# Patient Record
Sex: FEMALE | Race: BLACK OR AFRICAN AMERICAN | Employment: UNEMPLOYED | URBAN - METROPOLITAN AREA
[De-identification: names, ages, dates, MRNs, and addresses within clinical notes are randomized per-mention and may not be internally consistent; named-entity substitution may affect disease eponyms.]

---

## 2017-02-13 RX ORDER — HYDROCHLOROTHIAZIDE 25 MG/1
TABLET ORAL
Qty: 90 TABLET | Refills: 0 | OUTPATIENT
Start: 2017-02-13

## 2017-02-13 NOTE — TELEPHONE ENCOUNTER
Informed pt appt needed for f/u visit. Per pt need 90 day supply. Advised to make appt and Dr. Zander Salcido can renew for longer. Labs due and time for OV. appt made for tomorrow. Pt verbalized will do labs, fasting.        Hypertensive Medications  Protocol Criter

## 2017-02-14 ENCOUNTER — OFFICE VISIT (OUTPATIENT)
Dept: FAMILY MEDICINE CLINIC | Facility: CLINIC | Age: 57
End: 2017-02-14

## 2017-02-14 ENCOUNTER — APPOINTMENT (OUTPATIENT)
Dept: LAB | Age: 57
End: 2017-02-14
Attending: FAMILY MEDICINE
Payer: COMMERCIAL

## 2017-02-14 VITALS
WEIGHT: 234 LBS | HEART RATE: 92 BPM | TEMPERATURE: 98 F | DIASTOLIC BLOOD PRESSURE: 103 MMHG | SYSTOLIC BLOOD PRESSURE: 165 MMHG | HEIGHT: 67 IN | BODY MASS INDEX: 36.73 KG/M2

## 2017-02-14 DIAGNOSIS — F32.89 OTHER DEPRESSION: ICD-10-CM

## 2017-02-14 DIAGNOSIS — E78.00 HYPERCHOLESTEREMIA: Primary | ICD-10-CM

## 2017-02-14 DIAGNOSIS — E55.9 VITAMIN D DEFICIENCY: ICD-10-CM

## 2017-02-14 DIAGNOSIS — I10 ESSENTIAL HYPERTENSION: ICD-10-CM

## 2017-02-14 DIAGNOSIS — E78.00 HYPERCHOLESTEREMIA: ICD-10-CM

## 2017-02-14 DIAGNOSIS — E66.9 OBESITY (BMI 30-39.9): ICD-10-CM

## 2017-02-14 LAB
ALT SERPL-CCNC: 20 U/L (ref 14–54)
ANION GAP SERPL CALC-SCNC: 8 MMOL/L (ref 0–18)
AST SERPL-CCNC: 26 U/L (ref 15–41)
BUN SERPL-MCNC: 10 MG/DL (ref 8–20)
BUN/CREAT SERPL: 11.1 (ref 10–20)
CALCIUM SERPL-MCNC: 9.3 MG/DL (ref 8.5–10.5)
CHLORIDE SERPL-SCNC: 103 MMOL/L (ref 95–110)
CHOLEST SERPL-MCNC: 230 MG/DL (ref 110–200)
CO2 SERPL-SCNC: 27 MMOL/L (ref 22–32)
CREAT SERPL-MCNC: 0.9 MG/DL (ref 0.5–1.5)
GLUCOSE SERPL-MCNC: 82 MG/DL (ref 70–99)
HDLC SERPL-MCNC: 77 MG/DL
LDLC SERPL CALC-MCNC: 143 MG/DL (ref 0–99)
NONHDLC SERPL-MCNC: 153 MG/DL
OSMOLALITY UR CALC.SUM OF ELEC: 284 MOSM/KG (ref 275–295)
POTASSIUM SERPL-SCNC: 3.7 MMOL/L (ref 3.3–5.1)
SODIUM SERPL-SCNC: 138 MMOL/L (ref 136–144)
TRIGL SERPL-MCNC: 52 MG/DL (ref 1–149)
TSH SERPL-ACNC: 2.79 UIU/ML (ref 0.34–5.6)

## 2017-02-14 PROCEDURE — 80061 LIPID PANEL: CPT

## 2017-02-14 PROCEDURE — 84460 ALANINE AMINO (ALT) (SGPT): CPT

## 2017-02-14 PROCEDURE — 84450 TRANSFERASE (AST) (SGOT): CPT

## 2017-02-14 PROCEDURE — 99214 OFFICE O/P EST MOD 30 MIN: CPT | Performed by: FAMILY MEDICINE

## 2017-02-14 PROCEDURE — 99212 OFFICE O/P EST SF 10 MIN: CPT | Performed by: FAMILY MEDICINE

## 2017-02-14 PROCEDURE — 80048 BASIC METABOLIC PNL TOTAL CA: CPT

## 2017-02-14 PROCEDURE — 84443 ASSAY THYROID STIM HORMONE: CPT

## 2017-02-14 PROCEDURE — 36415 COLL VENOUS BLD VENIPUNCTURE: CPT

## 2017-02-14 RX ORDER — VALSARTAN 320 MG/1
TABLET ORAL
Qty: 90 TABLET | Refills: 1 | Status: SHIPPED | OUTPATIENT
Start: 2017-02-14 | End: 2017-05-22

## 2017-02-14 RX ORDER — HYDROCHLOROTHIAZIDE 25 MG/1
25 TABLET ORAL
Qty: 90 TABLET | Refills: 1 | Status: SHIPPED | OUTPATIENT
Start: 2017-02-14 | End: 2017-05-22

## 2017-02-14 NOTE — PROGRESS NOTES
Lennox Llano is a 55year old female. HPI:   Patient presents for recheck of her hypertension. Pt has been taking medications as instructed, no medication side effects      Ran out of bp medication 3 days ago.     Has gained some weight, has seen dieit Unspecified essential hypertension    • Primary hypothyroidism    • Other and unspecified hyperlipidemia           Past Surgical History    ELECTROCARDIOGRAM, COMPLETE  11/09/2013    Comment scanned to media tab, 11/09/2013    COLONOSCOPY  06/13/2014    C- dietary and eating habits as well as increasing vegetable and fruit intake. Recommending avoiding foods high in fat content. Recommend exercising at least 30-40 minutes 5-6 days a week. Avoid skipping meals.   Making healthy choices for snacks and also l

## 2017-02-17 ENCOUNTER — TELEPHONE (OUTPATIENT)
Dept: FAMILY MEDICINE CLINIC | Facility: CLINIC | Age: 57
End: 2017-02-17

## 2017-02-18 NOTE — TELEPHONE ENCOUNTER
Notes Recorded by Ben Oswald MD on 2/18/2017 at 1:18 PM  Labs ok except cholesterol still very elevated. would recommend starting statin. pls call patient. LMTCB, please transfer to [id number].

## 2017-02-22 NOTE — TELEPHONE ENCOUNTER
Pt was inform Madina message below and pt verbalized understanding. She will hold off on the statins and will talk to  on 2/28. Thanks

## 2017-02-28 ENCOUNTER — TELEPHONE (OUTPATIENT)
Dept: FAMILY MEDICINE CLINIC | Facility: CLINIC | Age: 57
End: 2017-02-28

## 2017-02-28 ENCOUNTER — OFFICE VISIT (OUTPATIENT)
Dept: FAMILY MEDICINE CLINIC | Facility: CLINIC | Age: 57
End: 2017-02-28

## 2017-02-28 VITALS
BODY MASS INDEX: 36.73 KG/M2 | SYSTOLIC BLOOD PRESSURE: 159 MMHG | DIASTOLIC BLOOD PRESSURE: 95 MMHG | HEART RATE: 113 BPM | HEIGHT: 67 IN | WEIGHT: 234 LBS | TEMPERATURE: 99 F

## 2017-02-28 DIAGNOSIS — R68.89 FLU-LIKE SYMPTOMS: ICD-10-CM

## 2017-02-28 DIAGNOSIS — B34.9 VIRAL ILLNESS: Primary | ICD-10-CM

## 2017-02-28 LAB
FLUAV + FLUBV RNA SPEC NAA+PROBE: NEGATIVE
FLUAV + FLUBV RNA SPEC NAA+PROBE: NEGATIVE
FLUAV + FLUBV RNA SPEC NAA+PROBE: POSITIVE

## 2017-02-28 PROCEDURE — 99213 OFFICE O/P EST LOW 20 MIN: CPT | Performed by: FAMILY MEDICINE

## 2017-02-28 PROCEDURE — 99212 OFFICE O/P EST SF 10 MIN: CPT | Performed by: FAMILY MEDICINE

## 2017-02-28 RX ORDER — OSELTAMIVIR PHOSPHATE 75 MG/1
75 CAPSULE ORAL 2 TIMES DAILY
Qty: 10 CAPSULE | Refills: 0 | Status: SHIPPED | OUTPATIENT
Start: 2017-02-28 | End: 2017-03-05

## 2017-02-28 NOTE — PROGRESS NOTES
HPI:   Katerin Reddy is a 55year old female who presents for upper respiratory symptoms for  1  days. Patient reports sore throat, congestion, fever with Tmax to 101, body aches.       Current Outpatient Prescriptions:  valsartan 320 MG Oral Tab TAKE 1 TAB conjunctiva are clear  HEENT: atraumatic, normocephalic,ears and throat are clear  NECK: supple,no adenopathy,no bruits  LUNGS: clear to auscultation  CARDIO: RRR without murmur  GI: good BS's,no masses, HSM or tenderness    ASSESSMENT AND PLAN:   Stephanie BUCK

## 2017-03-02 ENCOUNTER — TELEPHONE (OUTPATIENT)
Dept: FAMILY MEDICINE CLINIC | Facility: CLINIC | Age: 57
End: 2017-03-02

## 2017-03-02 NOTE — TELEPHONE ENCOUNTER
Reason for Call/Chief Complaint: headache  Onset: today  Nursing Assessment/Associated Symptoms: has been taking Tamiflu since Tuesday 2/28 and is noticing that her h/a is increasing, also c/o ear pressure.  Wants to know if the Tamiflu could be causing her

## 2017-03-02 NOTE — TELEPHONE ENCOUNTER
Pt states having headache  taking Tamiflu since Tuesday  Asking if can take Tylenol with other medications?   Saw Dr Clarence Song 2/28

## 2017-03-02 NOTE — TELEPHONE ENCOUNTER
Pt informed of Dr Rolanda Chambers response below. Pt will stop taking Tamiflu, push fluids, call back if no improvement/worsening--ER if severe. Pt agrees.

## 2017-03-02 NOTE — TELEPHONE ENCOUNTER
tamiflu may cause a headache,  She can try to stop and see if better  Push fluids  if worsening, call.

## 2017-05-22 ENCOUNTER — OFFICE VISIT (OUTPATIENT)
Dept: FAMILY MEDICINE CLINIC | Facility: CLINIC | Age: 57
End: 2017-05-22

## 2017-05-22 ENCOUNTER — TELEPHONE (OUTPATIENT)
Dept: GASTROENTEROLOGY | Facility: CLINIC | Age: 57
End: 2017-05-22

## 2017-05-22 VITALS
DIASTOLIC BLOOD PRESSURE: 85 MMHG | HEIGHT: 67 IN | BODY MASS INDEX: 35.79 KG/M2 | TEMPERATURE: 98 F | WEIGHT: 228 LBS | SYSTOLIC BLOOD PRESSURE: 148 MMHG | HEART RATE: 91 BPM

## 2017-05-22 DIAGNOSIS — F43.23 ADJUSTMENT DISORDER WITH MIXED ANXIETY AND DEPRESSED MOOD: ICD-10-CM

## 2017-05-22 DIAGNOSIS — Z00.00 WELL ADULT EXAM: Primary | ICD-10-CM

## 2017-05-22 DIAGNOSIS — E55.9 VITAMIN D DEFICIENCY: ICD-10-CM

## 2017-05-22 DIAGNOSIS — I10 ESSENTIAL HYPERTENSION: ICD-10-CM

## 2017-05-22 DIAGNOSIS — E66.9 OBESITY (BMI 30-39.9): ICD-10-CM

## 2017-05-22 DIAGNOSIS — R20.2 PARESTHESIA OF LEFT FOOT: ICD-10-CM

## 2017-05-22 DIAGNOSIS — E78.00 HYPERCHOLESTEREMIA: ICD-10-CM

## 2017-05-22 PROCEDURE — 99396 PREV VISIT EST AGE 40-64: CPT | Performed by: FAMILY MEDICINE

## 2017-05-22 RX ORDER — VALSARTAN 320 MG/1
TABLET ORAL
Qty: 90 TABLET | Refills: 3 | Status: SHIPPED | OUTPATIENT
Start: 2017-05-22 | End: 2018-03-19

## 2017-05-22 RX ORDER — HYDROCHLOROTHIAZIDE 25 MG/1
25 TABLET ORAL
Qty: 90 TABLET | Refills: 3 | Status: SHIPPED | OUTPATIENT
Start: 2017-05-22 | End: 2018-05-31

## 2017-05-22 NOTE — PROGRESS NOTES
HPI:   Duane Bertrand is a 55year old female who presents for a complete physical exam. Symptoms: is menopausal.    C/o numbness/ tingling left 1st toe.  X 2 weeks  No weakness no back pain    Wt Readings from Last 6 Encounters:  05/22/17 : 228 lb (103.42 k Relation Age of Onset   • Hypertension Father    • Cancer Father      intestinal cancer   • Hypertension Mother    • Diabetes Mother    • Hypertension     • Heart Disease     • Heart Disorder Maternal Grandmother 68   • Asthma Paternal Grandmother       So three,cranial nerves are intact,motor and sensory are grossly intact    ASSESSMENT AND PLAN:   Wayne Cordova is a 55year old female who presents for a complete physical exam.Health maintenance, will check fasting Lipids, CMP, and CBC.  Pt info handouts giv

## 2017-05-22 NOTE — TELEPHONE ENCOUNTER
Last colonoscopy 6/13/14 recalled for 5 years for family history of colon cancer - Due 6/2019. Health maintenance is up to date.   Paige Almaraz notified in [facility]

## 2017-06-10 ENCOUNTER — LAB ENCOUNTER (OUTPATIENT)
Dept: LAB | Age: 57
End: 2017-06-10
Attending: FAMILY MEDICINE
Payer: COMMERCIAL

## 2017-06-10 DIAGNOSIS — I10 ESSENTIAL HYPERTENSION: ICD-10-CM

## 2017-06-10 DIAGNOSIS — Z00.00 WELL ADULT EXAM: ICD-10-CM

## 2017-06-10 DIAGNOSIS — E78.00 HYPERCHOLESTEREMIA: ICD-10-CM

## 2017-06-10 DIAGNOSIS — E66.9 OBESITY (BMI 30-39.9): ICD-10-CM

## 2017-06-10 DIAGNOSIS — R20.2 PARESTHESIA OF LEFT FOOT: ICD-10-CM

## 2017-06-10 PROCEDURE — 82607 VITAMIN B-12: CPT

## 2017-06-10 PROCEDURE — 36415 COLL VENOUS BLD VENIPUNCTURE: CPT

## 2017-06-10 PROCEDURE — 85025 COMPLETE CBC W/AUTO DIFF WBC: CPT

## 2017-06-10 PROCEDURE — 85060 BLOOD SMEAR INTERPRETATION: CPT

## 2017-06-10 PROCEDURE — 85007 BL SMEAR W/DIFF WBC COUNT: CPT

## 2017-06-10 PROCEDURE — 83036 HEMOGLOBIN GLYCOSYLATED A1C: CPT

## 2017-06-10 PROCEDURE — 80053 COMPREHEN METABOLIC PANEL: CPT

## 2017-06-10 PROCEDURE — 80061 LIPID PANEL: CPT

## 2017-06-10 PROCEDURE — 84550 ASSAY OF BLOOD/URIC ACID: CPT

## 2017-06-10 PROCEDURE — 85027 COMPLETE CBC AUTOMATED: CPT

## 2017-06-12 ENCOUNTER — TELEPHONE (OUTPATIENT)
Dept: FAMILY MEDICINE CLINIC | Facility: CLINIC | Age: 57
End: 2017-06-12

## 2017-06-19 NOTE — TELEPHONE ENCOUNTER
Notes Recorded by Pete Philip MD on 6/17/2017 at 1:36 PM  Blood work shows there is no diabetes but you are at risk for diabetes.  Recommend losing weight and decreasing carbohydrate intake and increasing vegetable intake.  B12 level is normal. [employer]

## 2017-06-19 NOTE — TELEPHONE ENCOUNTER
Reviewed lab results with pt. Mailed copies per pt's request.  Pt is also asking doctor if it is ok for her to take a multivitamin, magnesium and fish oil. Pt states that she believes magnesium may help her with her dry skin.   States no lotions have help

## 2017-07-22 ENCOUNTER — OFFICE VISIT (OUTPATIENT)
Dept: OBGYN CLINIC | Facility: CLINIC | Age: 57
End: 2017-07-22

## 2017-07-22 VITALS
WEIGHT: 231.38 LBS | DIASTOLIC BLOOD PRESSURE: 95 MMHG | SYSTOLIC BLOOD PRESSURE: 141 MMHG | BODY MASS INDEX: 35.48 KG/M2 | HEIGHT: 67.75 IN | HEART RATE: 105 BPM

## 2017-07-22 DIAGNOSIS — Z12.31 VISIT FOR SCREENING MAMMOGRAM: ICD-10-CM

## 2017-07-22 DIAGNOSIS — Z01.419 ENCOUNTER FOR GYNECOLOGICAL EXAMINATION WITHOUT ABNORMAL FINDING: Primary | ICD-10-CM

## 2017-07-22 DIAGNOSIS — Z12.4 SCREENING FOR MALIGNANT NEOPLASM OF CERVIX: ICD-10-CM

## 2017-07-22 PROCEDURE — 99396 PREV VISIT EST AGE 40-64: CPT | Performed by: OBSTETRICS & GYNECOLOGY

## 2017-07-22 NOTE — PROGRESS NOTES
Chema Jules is a 65year old female  No LMP recorded. Patient is not currently having periods (Reason: Menopause). who presents for Patient presents with:  Gyn Exam: annual exam, mammo order  Pt is new to our practice. She has no complaints.   Pt needed. , Disp: 60 g, Rfl: 1  •  aspirin 81 MG Oral Tab, Take 81 mg by mouth daily. , Disp: , Rfl:   •  Vitamin D3 (VITAMIN D3) 2000 UNITS Oral Cap, Take 2,000 Units by mouth daily. , Disp: , Rfl:     ALLERGIES:  No Known Allergies      Review of Systems:  Co tenderness on motion  Uterus: normal in size, contour, position, mobility, without tenderness  Adnexa: normal without masses or tenderness  Perineum: normal  Rectovaginal: no masses, normal tone  Anus: no hemorroids    Assessment & Plan:   ASCCP guidelines

## 2017-07-24 LAB — HPV I/H RISK 1 DNA SPEC QL NAA+PROBE: NEGATIVE

## 2017-08-03 ENCOUNTER — TELEPHONE (OUTPATIENT)
Dept: FAMILY MEDICINE CLINIC | Facility: CLINIC | Age: 57
End: 2017-08-03

## 2017-08-03 NOTE — TELEPHONE ENCOUNTER
Protocol Used: Sore Throat (Adult)  Protocol-Based Disposition: See Today in Office    Positive Triage Question:  * Severe sore throat pain    Negative Triage Questions:   * Severe difficulty breathing (e.g., struggling for each breath, speaks in single wor

## 2017-08-03 NOTE — TELEPHONE ENCOUNTER
This was not addressed by CSS until 4:20pm. LM with pt asking if she could see Dr. Gio Cedillo at 6:30pm tonight at our [place] [id number].? Please confirm upon pt's call back.  Thank you

## 2017-08-03 NOTE — TELEPHONE ENCOUNTER
Actions Requested: Please sign off  Problem: sore throat  Onset and Timing: last night  Associated Symptoms: hurts to swallow,  Aggravating by: nothing  Alleviated by: nothing  Triage Note: Pt denies fever, nasal congestion, denies allergies/cough, SOB.  No

## 2017-08-03 NOTE — TELEPHONE ENCOUNTER
Pt was contacted. She will not be able to see Dr. Joe Mondragon this evening.  She stts that she will probably end up going to an urgent care

## 2017-08-07 NOTE — TELEPHONE ENCOUNTER
Patient was left a message to call back if symptoms continue and has not seen anyone for tx.  Transfer to [facility]

## 2017-09-02 ENCOUNTER — OFFICE VISIT (OUTPATIENT)
Dept: FAMILY MEDICINE CLINIC | Facility: CLINIC | Age: 57
End: 2017-09-02

## 2017-09-02 VITALS
SYSTOLIC BLOOD PRESSURE: 137 MMHG | TEMPERATURE: 98 F | DIASTOLIC BLOOD PRESSURE: 87 MMHG | HEART RATE: 99 BPM | HEIGHT: 67 IN | BODY MASS INDEX: 36.41 KG/M2 | WEIGHT: 232 LBS

## 2017-09-02 DIAGNOSIS — I83.93 VARICOSE VEINS OF LEGS: ICD-10-CM

## 2017-09-02 DIAGNOSIS — R20.2 PARESTHESIA OF LEFT FOOT: ICD-10-CM

## 2017-09-02 DIAGNOSIS — I10 ESSENTIAL HYPERTENSION: Primary | ICD-10-CM

## 2017-09-02 PROCEDURE — 99214 OFFICE O/P EST MOD 30 MIN: CPT | Performed by: FAMILY MEDICINE

## 2017-09-02 PROCEDURE — 99212 OFFICE O/P EST SF 10 MIN: CPT | Performed by: FAMILY MEDICINE

## 2017-09-02 NOTE — PATIENT INSTRUCTIONS
Self-Care for Spider and Varicose Veins  Your healthcare provider may suggest that you try self-care. Exercising and maintaining a healthy weight may keep problem veins from getting worse.  Wearing elastic stockings and elevating your legs can help improv When sitting and standing  To keep blood moving when you have to sit or stand for long periods, try these tips:  · At work, take walking breaks instead of coffee breaks. Walk during your lunch hour. Or try flexing your feet up and down 10 times each hour.

## 2017-09-02 NOTE — PROGRESS NOTES
Cierra Noriega is a 65year old female. HPI:   Patient presents for recheck of her hypertension.  Pt has been taking medications as instructed, no medication side effects,     Wt Readings from Last 6 Encounters:  09/02/17 : 232 lb (105.2 kg)  07/22/17 : History:  No date:       Comment: 2  2014: COLONOSCOPY  2013: ELECTROCARDIOGRAM, COMPLETE      Comment: scanned to media tab, 2013   Social History:    Smoking status: Never Smoker

## 2017-10-17 ENCOUNTER — OFFICE VISIT (OUTPATIENT)
Dept: FAMILY MEDICINE CLINIC | Facility: CLINIC | Age: 57
End: 2017-10-17

## 2017-10-17 VITALS
TEMPERATURE: 98 F | DIASTOLIC BLOOD PRESSURE: 81 MMHG | BODY MASS INDEX: 35.94 KG/M2 | SYSTOLIC BLOOD PRESSURE: 108 MMHG | HEART RATE: 98 BPM | WEIGHT: 229 LBS | HEIGHT: 67 IN

## 2017-10-17 DIAGNOSIS — F43.21 GRIEF REACTION: ICD-10-CM

## 2017-10-17 DIAGNOSIS — B35.4 TINEA CORPORIS: ICD-10-CM

## 2017-10-17 DIAGNOSIS — I10 ESSENTIAL HYPERTENSION: ICD-10-CM

## 2017-10-17 DIAGNOSIS — L29.9 PRURITUS: Primary | ICD-10-CM

## 2017-10-17 DIAGNOSIS — L50.3 DERMATOGRAPHISM: ICD-10-CM

## 2017-10-17 PROCEDURE — 99212 OFFICE O/P EST SF 10 MIN: CPT | Performed by: FAMILY MEDICINE

## 2017-10-17 PROCEDURE — 99214 OFFICE O/P EST MOD 30 MIN: CPT | Performed by: FAMILY MEDICINE

## 2017-10-17 RX ORDER — HYDROXYZINE HYDROCHLORIDE 25 MG/1
25 TABLET, FILM COATED ORAL 3 TIMES DAILY PRN
Qty: 30 TABLET | Refills: 0 | Status: SHIPPED | OUTPATIENT
Start: 2017-10-17 | End: 2017-11-28

## 2017-10-17 RX ORDER — KETOCONAZOLE 20 MG/G
1 CREAM TOPICAL 2 TIMES DAILY
Qty: 1 TUBE | Refills: 0 | Status: SHIPPED | OUTPATIENT
Start: 2017-10-17 | End: 2018-03-19

## 2017-10-17 NOTE — PROGRESS NOTES
HPI:    Patient ID: Katerin Reddy is a 65year old female. HPI     Patient is here with complains of rash on the left side of her mouth that she has noticed recently. She states is slightly C shaped at this and is on the left corner of her mouth.   It i times daily as needed. Disp: 60 g Rfl: 1     Allergies:No Known Allergies   PHYSICAL EXAM:   Physical Exam   Constitutional: She appears well-developed and well-nourished. Skin: Rash noted.    Mouth- left angle of mouth, slightly raised with some central

## 2017-10-28 ENCOUNTER — HOSPITAL ENCOUNTER (OUTPATIENT)
Dept: MAMMOGRAPHY | Age: 57
Discharge: HOME | End: 2017-10-28
Attending: OBSTETRICS & GYNECOLOGY
Payer: COMMERCIAL

## 2017-10-28 DIAGNOSIS — Z12.31 VISIT FOR SCREENING MAMMOGRAM: ICD-10-CM

## 2017-10-28 PROCEDURE — 77067 SCR MAMMO BI INCL CAD: CPT | Performed by: OBSTETRICS & GYNECOLOGY

## 2017-11-14 ENCOUNTER — OFFICE VISIT (OUTPATIENT)
Dept: PODIATRY CLINIC | Facility: CLINIC | Age: 57
End: 2017-11-14

## 2017-11-14 DIAGNOSIS — R20.2 PARESTHESIA OF LEFT FOOT: Primary | ICD-10-CM

## 2017-11-14 PROCEDURE — 99243 OFF/OP CNSLTJ NEW/EST LOW 30: CPT | Performed by: PODIATRIST

## 2017-11-14 PROCEDURE — 99212 OFFICE O/P EST SF 10 MIN: CPT | Performed by: PODIATRIST

## 2017-11-15 NOTE — PROGRESS NOTES
HPI:    Patient ID: Regis Colon is a 65year old female. HPI  This pleasant 60-year-old female presents as a new patient to me on consult from .   Patient has a very unusual complaint and feels as though there is a band at the base of her left g the feeling were talking about is not related to pain. She does not have the feeling elsewhere in this foot and I am struggling to make the diagnosis.   I discussed potential for diabetes although there is a positive family history her labs are within norm

## 2017-11-28 ENCOUNTER — OFFICE VISIT (OUTPATIENT)
Dept: DERMATOLOGY CLINIC | Facility: CLINIC | Age: 57
End: 2017-11-28

## 2017-11-28 DIAGNOSIS — L50.9 URTICARIA: ICD-10-CM

## 2017-11-28 DIAGNOSIS — L50.3 DERMATOGRAPHISM: Primary | ICD-10-CM

## 2017-11-28 PROCEDURE — 99202 OFFICE O/P NEW SF 15 MIN: CPT | Performed by: DERMATOLOGY

## 2017-11-28 PROCEDURE — 99212 OFFICE O/P EST SF 10 MIN: CPT | Performed by: DERMATOLOGY

## 2017-11-28 RX ORDER — LEVOCETIRIZINE DIHYDROCHLORIDE 5 MG/1
5 TABLET, FILM COATED ORAL DAILY
Qty: 30 TABLET | Refills: 10 | Status: SHIPPED | OUTPATIENT
Start: 2017-11-28 | End: 2019-03-30

## 2017-11-28 NOTE — PROGRESS NOTES
Patient states that itching HPI:     Chief Complaint     Rash        HPI     Rash    Additional comments: New pt. Pt presents with concern of generalized pruritic condition with hive developement.   Pt states that hives and pruritus wax and wane for the la Rfl:    valsartan 320 MG Oral Tab TAKE 1 TABLET BY MOUTH ONCE DAILY. Disp: 90 tablet Rfl: 3   hydrochlorothiazide 25 MG Oral Tab Take 1 tablet (25 mg total) by mouth once daily. Disp: 90 tablet Rfl: 3   aspirin 81 MG Oral Tab Take 81 mg by mouth daily.  Dis scaling. Skin not xerotic. ASSESSMENT/PLAN:   Dermatographism  (primary encounter diagnosis)  Urticaria  I have told patient that over 50% of these cases are idiopathic and we may never know the etiology.   I told her to be sure she is up to s date wi

## 2017-11-28 NOTE — PROGRESS NOTES
Past Medical History:   Diagnosis Date   • Other and unspecified hyperlipidemia    • Primary hypothyroidism    • Unspecified essential hypertension      Past Surgical History:  No date:       Comment: 2  2014: COLONOSCOPY  2013: Maggie Huerta

## 2017-11-28 NOTE — PATIENT INSTRUCTIONS
Recommend avoidance of fabric softener and use of enzyme free detergents ( eg Dreft or Arm and Hammer) only.   Moisturize head to toe after bathing with Cerave or Cetaphil cream. Use preferably soapless cleansers such as Cetaphil, or fatted soaps such as un

## 2018-01-03 RX ORDER — VALSARTAN 320 MG/1
TABLET ORAL
Qty: 90 TABLET | Refills: 0 | Status: SHIPPED | OUTPATIENT
Start: 2018-01-03 | End: 2018-03-19

## 2018-01-03 NOTE — TELEPHONE ENCOUNTER
Hypertensive Medications: Refilled per protocol    Protocol Criteria:  · Appointment scheduled in the past 6 months or in the next 3 months  · BMP or CMP in the past 12 months  · Creatinine result < 2  Recent Outpatient Visits            1 month ago Dermat

## 2018-03-17 ENCOUNTER — TELEPHONE (OUTPATIENT)
Dept: OTHER | Age: 58
End: 2018-03-17

## 2018-03-17 NOTE — TELEPHONE ENCOUNTER
Pt asking if doctor would order an Xray of her leg. Pt state she her left calf, ankle, and leg feel heavy. Pt states she has talked to doctor in the past about these symptoms. No burning, no redness or warmth.  Sometimes left calf looks bigger than the righ

## 2018-03-17 NOTE — TELEPHONE ENCOUNTER
Patient has had a left leg Doppler in the past in 2015 and was negative. She should schedule an appointment so I can evaluate this better.

## 2018-03-19 ENCOUNTER — OFFICE VISIT (OUTPATIENT)
Dept: FAMILY MEDICINE CLINIC | Facility: CLINIC | Age: 58
End: 2018-03-19

## 2018-03-19 VITALS
TEMPERATURE: 99 F | HEIGHT: 68 IN | BODY MASS INDEX: 35.35 KG/M2 | SYSTOLIC BLOOD PRESSURE: 134 MMHG | DIASTOLIC BLOOD PRESSURE: 87 MMHG | WEIGHT: 233.25 LBS | HEART RATE: 94 BPM

## 2018-03-19 DIAGNOSIS — R20.2 PARESTHESIA OF LEFT LEG: ICD-10-CM

## 2018-03-19 DIAGNOSIS — R29.898 LEG HEAVINESS: Primary | ICD-10-CM

## 2018-03-19 DIAGNOSIS — I10 ESSENTIAL HYPERTENSION: ICD-10-CM

## 2018-03-19 PROCEDURE — 99212 OFFICE O/P EST SF 10 MIN: CPT | Performed by: FAMILY MEDICINE

## 2018-03-19 PROCEDURE — 99214 OFFICE O/P EST MOD 30 MIN: CPT | Performed by: FAMILY MEDICINE

## 2018-03-19 RX ORDER — VALSARTAN 320 MG/1
320 TABLET ORAL
Qty: 90 TABLET | Refills: 3 | Status: SHIPPED | OUTPATIENT
Start: 2018-03-19 | End: 2019-04-05

## 2018-03-19 NOTE — PROGRESS NOTES
HPI:    Patient ID: Jaison Posey is a 65year old female. HPI     Patient here for follow up of left leg tingling/ and sensation of heaviness since over 2 yrs  Has had neg US for DVT in 2015    No back pain  Feels middle 3 toes get numb.       Review of person, place, and time. Skin: Skin is warm and dry. No rash noted. ASSESSMENT/PLAN:   Leg heaviness  (primary encounter diagnosis)  Paresthesia of left leg  Essential hypertension    1.  Leg heaviness  Suspect lumbar radiculopathy  - D-DIMER

## 2018-03-22 ENCOUNTER — PATIENT MESSAGE (OUTPATIENT)
Dept: FAMILY MEDICINE CLINIC | Facility: CLINIC | Age: 58
End: 2018-03-22

## 2018-03-23 NOTE — TELEPHONE ENCOUNTER
From: Binnie Landau  To: Darshan Millan MD  Sent: 3/22/2018 11:51 AM CDT  Subject: Other    Dear Dr. Clarence Song,  I am considering starting a vitamin by [employer] (Hair, Skin & Nails, Argan Oil Infused with 5000mcg Biotin, Antioxidants C & E) + Hyaluronic A

## 2018-03-30 ENCOUNTER — LAB ENCOUNTER (OUTPATIENT)
Dept: LAB | Age: 58
End: 2018-03-30
Attending: FAMILY MEDICINE
Payer: COMMERCIAL

## 2018-03-30 DIAGNOSIS — R29.898 LEG HEAVINESS: ICD-10-CM

## 2018-03-30 DIAGNOSIS — R20.2 PARESTHESIA OF LEFT LEG: ICD-10-CM

## 2018-03-30 DIAGNOSIS — I10 ESSENTIAL HYPERTENSION: ICD-10-CM

## 2018-03-30 PROCEDURE — 36415 COLL VENOUS BLD VENIPUNCTURE: CPT

## 2018-03-30 PROCEDURE — 80053 COMPREHEN METABOLIC PANEL: CPT

## 2018-03-30 PROCEDURE — 80050 GENERAL HEALTH PANEL: CPT

## 2018-03-30 PROCEDURE — 85025 COMPLETE CBC W/AUTO DIFF WBC: CPT

## 2018-03-30 PROCEDURE — 85379 FIBRIN DEGRADATION QUANT: CPT

## 2018-03-30 PROCEDURE — 82607 VITAMIN B-12: CPT

## 2018-04-02 ENCOUNTER — TELEPHONE (OUTPATIENT)
Dept: FAMILY MEDICINE CLINIC | Facility: CLINIC | Age: 58
End: 2018-04-02

## 2018-04-02 ENCOUNTER — HOSPITAL ENCOUNTER (OUTPATIENT)
Dept: ULTRASOUND IMAGING | Facility: HOSPITAL | Age: 58
Discharge: HOME | End: 2018-04-02
Attending: FAMILY MEDICINE
Payer: COMMERCIAL

## 2018-04-02 DIAGNOSIS — R20.2 PARESTHESIA OF LEFT LEG: ICD-10-CM

## 2018-04-02 DIAGNOSIS — R79.89 ELEVATED D-DIMER: ICD-10-CM

## 2018-04-02 PROCEDURE — 93971 EXTREMITY STUDY: CPT | Performed by: FAMILY MEDICINE

## 2018-04-02 NOTE — TELEPHONE ENCOUNTER
Notes recorded by Jose Salcedo MD on 4/1/2018 at 10:19 PM CDT  Labs ok other than D dimer elevated  Recommend us left leg  Has had symptoms for 2 yrs. Neg US in 2015.   Recommend to get test tomorrow 4/2

## 2018-04-02 NOTE — TELEPHONE ENCOUNTER
COURT. Please reply to pool: EM RN TRIAGE    Nurse triage - after speaking w/ pt, please call US at [phone] to figure out a time when they can fit patient in. They are only there until 8p.

## 2018-04-11 ENCOUNTER — OFFICE VISIT (OUTPATIENT)
Dept: NEUROLOGY | Facility: CLINIC | Age: 58
End: 2018-04-11

## 2018-04-11 DIAGNOSIS — R20.2 PARESTHESIA OF LEFT LEG: Primary | ICD-10-CM

## 2018-04-11 PROCEDURE — 95908 NRV CNDJ TST 3-4 STUDIES: CPT | Performed by: PHYSICAL MEDICINE & REHABILITATION

## 2018-04-11 PROCEDURE — 95886 MUSC TEST DONE W/N TEST COMP: CPT | Performed by: PHYSICAL MEDICINE & REHABILITATION

## 2018-04-11 NOTE — PROCEDURES
ANTONY De Luna  [address] [facility]  Phone: [phone]  Fax: [phone] REPORT          Patient: Janelle Garcia YOB: 1960  Patient ID: [id number] Hand Dominance: right hand AH 5.42 4.0 4.79 Ankle - AH 8        Knee AH 15.31 3.9 5.68 Knee - Ankle 45 9.90 45       Sensory NCS      Nerve / Sites Rec.  Site Onset Lat Peak Lat NP Amp PP Amp Segments Distance Velocity     ms ms µV µV  cm m/s   L SURAL - Lat Mall      Calf Lat Mall 3

## 2018-04-17 ENCOUNTER — TELEPHONE (OUTPATIENT)
Dept: FAMILY MEDICINE CLINIC | Facility: CLINIC | Age: 58
End: 2018-04-17

## 2018-04-17 NOTE — TELEPHONE ENCOUNTER
----- Message from Vito Wilks MD sent at 4/15/2018 10:28 PM CDT -----  Normal emg study.  Recommend seeing vascular surgery  for eval as emg normal.  pls inform patient    Referred to  Pamela Millan [place]  [address] [facility]

## 2018-06-01 RX ORDER — HYDROCHLOROTHIAZIDE 25 MG/1
25 TABLET ORAL
Qty: 90 TABLET | Refills: 0 | Status: SHIPPED | OUTPATIENT
Start: 2018-06-01 | End: 2018-09-01

## 2018-06-01 NOTE — TELEPHONE ENCOUNTER
Requesting HCTZ refill    Prescription refilled per IM/FM refill protocol    Hypertensive Medications  Protocol Criteria:  · Appointment scheduled in the past 6 months or in the next 3 months  · BMP or CMP in the past 12 months  · Creatinine result < 2  Re

## 2018-06-19 ENCOUNTER — TELEPHONE (OUTPATIENT)
Dept: OTHER | Age: 58
End: 2018-06-19

## 2018-06-19 NOTE — TELEPHONE ENCOUNTER
Dr Zander Salcido, please advise. Patient continues with issues of her left leg, starting to have pain again.  Has appt 7/17/18 with vascular surgeon, she is on their waiting list. Verner Ice about PAD, peripheral arterial disease and wants to know about getting tested

## 2018-06-19 NOTE — TELEPHONE ENCOUNTER
Left detailed message on dedicated voice mail relaying Dr Mercedes Fernandez message, her last physical was May 2017 and she can schedule it at any time.  Her vascular surgeon can do leg exam and order any tests that are appropriate for PAD

## 2018-06-19 NOTE — TELEPHONE ENCOUNTER
She is due for physical, last one 5/22/17    All those tests if appropriate will be ordered by vascular

## 2018-06-27 RX ORDER — VALSARTAN 320 MG/1
TABLET ORAL
Qty: 90 TABLET | Refills: 1 | OUTPATIENT
Start: 2018-06-27

## 2018-07-02 NOTE — TELEPHONE ENCOUNTER
Pt called to find out why script was denied, pt was informed that years worth of med was sent on 3/19/18. Pt was just at the pharmacy and was told to [facility] and call the office. \"  Called pharamcy, they see the new prescription, state that refill was re

## 2018-07-05 PROBLEM — R60.0 LEG EDEMA, LEFT: Status: ACTIVE | Noted: 2018-07-05

## 2018-07-06 ENCOUNTER — TELEPHONE (OUTPATIENT)
Dept: OTHER | Age: 58
End: 2018-07-06

## 2018-07-06 NOTE — TELEPHONE ENCOUNTER
Spoke with patient and informed her of Maxime Zapata message and recommendations. Patient voiced understanding and stated she will call the pharmacy and will return call to clinic if she needs anything else from PCP.

## 2018-07-06 NOTE — TELEPHONE ENCOUNTER
The valsartan that is being recalled by the [employer] and is supplied by [employer], a company based in [facility].     I would advise pt to call pharmacy and see what pharmaceutical company he

## 2018-07-06 NOTE — TELEPHONE ENCOUNTER
Patient called tearful stating she has heard an upsetting story on the news about valsartan and she would like to be taken off of it. She states that the medication has cancer causing ingredients in it.  Patient would like to know doctors thoughts about thi

## 2018-09-01 RX ORDER — HYDROCHLOROTHIAZIDE 25 MG/1
25 TABLET ORAL
Qty: 90 TABLET | Refills: 0 | Status: SHIPPED | OUTPATIENT
Start: 2018-09-01 | End: 2018-12-11

## 2018-09-01 NOTE — TELEPHONE ENCOUNTER
Refill passed per [facility] protocol.   Hypertensive Medications  Protocol Criteria:  · Appointment scheduled in the past 6 months or in the next 3 months  · BMP or CMP in the past 12 months  · Creatinine result < 2  Recent Outpatient Visits

## 2018-12-12 RX ORDER — HYDROCHLOROTHIAZIDE 25 MG/1
25 TABLET ORAL
Qty: 90 TABLET | Refills: 0 | Status: SHIPPED | OUTPATIENT
Start: 2018-12-12 | End: 2019-03-08

## 2018-12-12 NOTE — TELEPHONE ENCOUNTER
Please review; protocol failed.   Hypertensive Medications  Protocol Criteria:  · Appointment scheduled in the past 6 months or in the next 3 months  · BMP or CMP in the past 12 months  · Creatinine result < 2  Recent Outpatient Visits            5 months a

## 2018-12-18 ENCOUNTER — OFFICE VISIT (OUTPATIENT)
Dept: OBGYN CLINIC | Facility: CLINIC | Age: 58
End: 2018-12-18
Payer: COMMERCIAL

## 2018-12-18 VITALS
BODY MASS INDEX: 36.14 KG/M2 | HEART RATE: 101 BPM | HEIGHT: 67.5 IN | WEIGHT: 233 LBS | DIASTOLIC BLOOD PRESSURE: 90 MMHG | SYSTOLIC BLOOD PRESSURE: 134 MMHG

## 2018-12-18 DIAGNOSIS — Z12.31 VISIT FOR SCREENING MAMMOGRAM: ICD-10-CM

## 2018-12-18 DIAGNOSIS — Z01.419 ENCOUNTER FOR GYNECOLOGICAL EXAMINATION WITHOUT ABNORMAL FINDING: Primary | ICD-10-CM

## 2018-12-18 DIAGNOSIS — R68.82 DECREASED LIBIDO: ICD-10-CM

## 2018-12-18 PROCEDURE — 99396 PREV VISIT EST AGE 40-64: CPT | Performed by: OBSTETRICS & GYNECOLOGY

## 2018-12-18 PROCEDURE — 99212 OFFICE O/P EST SF 10 MIN: CPT | Performed by: OBSTETRICS & GYNECOLOGY

## 2019-01-08 ENCOUNTER — OFFICE VISIT (OUTPATIENT)
Dept: FAMILY MEDICINE CLINIC | Facility: CLINIC | Age: 59
End: 2019-01-08
Payer: COMMERCIAL

## 2019-01-08 VITALS
HEIGHT: 67.5 IN | BODY MASS INDEX: 36.3 KG/M2 | TEMPERATURE: 98 F | DIASTOLIC BLOOD PRESSURE: 88 MMHG | HEART RATE: 88 BPM | WEIGHT: 234 LBS | SYSTOLIC BLOOD PRESSURE: 141 MMHG

## 2019-01-08 DIAGNOSIS — Z83.3 FH: DIABETES MELLITUS: ICD-10-CM

## 2019-01-08 DIAGNOSIS — I10 ESSENTIAL HYPERTENSION: Primary | ICD-10-CM

## 2019-01-08 DIAGNOSIS — Z80.0 FH: COLON CANCER: ICD-10-CM

## 2019-01-08 DIAGNOSIS — Z82.49 FH: CAD (CORONARY ARTERY DISEASE): ICD-10-CM

## 2019-01-08 DIAGNOSIS — E78.00 HYPERCHOLESTEREMIA: ICD-10-CM

## 2019-01-08 DIAGNOSIS — Z12.11 COLON CANCER SCREENING: ICD-10-CM

## 2019-01-08 DIAGNOSIS — E66.9 OBESITY (BMI 30-39.9): ICD-10-CM

## 2019-01-08 PROCEDURE — 99212 OFFICE O/P EST SF 10 MIN: CPT | Performed by: FAMILY MEDICINE

## 2019-01-08 PROCEDURE — 99214 OFFICE O/P EST MOD 30 MIN: CPT | Performed by: FAMILY MEDICINE

## 2019-01-08 NOTE — PROGRESS NOTES
HPI:    Patient ID: Chinyere Sol is a 63year old female. HPI     Patient here for follow-up. Overall she states she is doing well. Denies any chest pain or shortness of breath. Denies any leg swelling.   She states she is having a hard time wearing No thyromegaly present. Cardiovascular: Normal rate and regular rhythm. No murmur heard. Pulmonary/Chest: Effort normal and breath sounds normal. She has no wheezes. Musculoskeletal: She exhibits no edema.    Neurological: She is alert and oriented t

## 2019-01-10 ENCOUNTER — HOSPITAL ENCOUNTER (OUTPATIENT)
Dept: MAMMOGRAPHY | Age: 59
Discharge: HOME | End: 2019-01-10
Attending: OBSTETRICS & GYNECOLOGY
Payer: COMMERCIAL

## 2019-01-10 ENCOUNTER — TELEPHONE (OUTPATIENT)
Dept: OTHER | Age: 59
End: 2019-01-10

## 2019-01-10 DIAGNOSIS — Z12.31 VISIT FOR SCREENING MAMMOGRAM: ICD-10-CM

## 2019-01-10 PROCEDURE — 77067 SCR MAMMO BI INCL CAD: CPT | Performed by: OBSTETRICS & GYNECOLOGY

## 2019-01-10 PROCEDURE — 77063 BREAST TOMOSYNTHESIS BI: CPT | Performed by: OBSTETRICS & GYNECOLOGY

## 2019-01-10 NOTE — TELEPHONE ENCOUNTER
There would be no reason to order it (thyroid) sooner than 1 year if no problem and not on thyroid meds.

## 2019-01-10 NOTE — TELEPHONE ENCOUNTER
Patient calling and asking about what labs were ordered at her last office visit. She is asking if the test for her thyroid can be ordered. It was last done on 3/30/18   Her Vitamin B 12 and CBC was last done on  3/30/19 also.      Patient is asking for

## 2019-01-14 ENCOUNTER — APPOINTMENT (OUTPATIENT)
Dept: LAB | Age: 59
End: 2019-01-14
Attending: FAMILY MEDICINE
Payer: COMMERCIAL

## 2019-01-14 ENCOUNTER — TELEPHONE (OUTPATIENT)
Dept: FAMILY MEDICINE CLINIC | Facility: CLINIC | Age: 59
End: 2019-01-14

## 2019-01-14 DIAGNOSIS — E66.9 OBESITY (BMI 30-39.9): ICD-10-CM

## 2019-01-14 DIAGNOSIS — Z83.3 FH: DIABETES MELLITUS: ICD-10-CM

## 2019-01-14 DIAGNOSIS — E78.00 HYPERCHOLESTEREMIA: ICD-10-CM

## 2019-01-14 DIAGNOSIS — I10 ESSENTIAL HYPERTENSION: ICD-10-CM

## 2019-01-14 LAB
ALBUMIN SERPL BCP-MCNC: 3.9 G/DL (ref 3.5–4.8)
ALBUMIN/GLOB SERPL: 1.1 {RATIO} (ref 1–2)
ALP SERPL-CCNC: 99 U/L (ref 32–100)
ALT SERPL-CCNC: 18 U/L (ref 14–54)
ANION GAP SERPL CALC-SCNC: 10 MMOL/L (ref 0–18)
AST SERPL-CCNC: 24 U/L (ref 15–41)
BILIRUB SERPL-MCNC: 0.7 MG/DL (ref 0.3–1.2)
BUN SERPL-MCNC: 8 MG/DL (ref 8–20)
BUN/CREAT SERPL: 8.7 (ref 10–20)
CALCIUM SERPL-MCNC: 9.5 MG/DL (ref 8.5–10.5)
CHLORIDE SERPL-SCNC: 100 MMOL/L (ref 95–110)
CHOLEST SERPL-MCNC: 242 MG/DL (ref 110–200)
CO2 SERPL-SCNC: 27 MMOL/L (ref 22–32)
CREAT SERPL-MCNC: 0.92 MG/DL (ref 0.5–1.5)
EST. AVERAGE GLUCOSE BLD GHB EST-MCNC: 128 MG/DL (ref 68–126)
GLOBULIN PLAS-MCNC: 3.4 G/DL (ref 2.5–3.7)
GLUCOSE SERPL-MCNC: 86 MG/DL (ref 70–99)
HBA1C MFR BLD HPLC: 6.1 % (ref ?–5.7)
HDLC SERPL-MCNC: 73 MG/DL
LDLC SERPL CALC-MCNC: 152 MG/DL (ref 0–99)
NONHDLC SERPL-MCNC: 169 MG/DL
OSMOLALITY UR CALC.SUM OF ELEC: 282 MOSM/KG (ref 275–295)
PATIENT FASTING: YES
POTASSIUM SERPL-SCNC: 4.4 MMOL/L (ref 3.3–5.1)
PROT SERPL-MCNC: 7.3 G/DL (ref 5.9–8.4)
SODIUM SERPL-SCNC: 137 MMOL/L (ref 136–144)
TRIGL SERPL-MCNC: 85 MG/DL (ref 1–149)

## 2019-01-14 PROCEDURE — 80053 COMPREHEN METABOLIC PANEL: CPT

## 2019-01-14 PROCEDURE — 93005 ELECTROCARDIOGRAM TRACING: CPT

## 2019-01-14 PROCEDURE — 80061 LIPID PANEL: CPT

## 2019-01-14 PROCEDURE — 93010 ELECTROCARDIOGRAM REPORT: CPT | Performed by: FAMILY MEDICINE

## 2019-01-14 PROCEDURE — 36415 COLL VENOUS BLD VENIPUNCTURE: CPT

## 2019-01-14 PROCEDURE — 83036 HEMOGLOBIN GLYCOSYLATED A1C: CPT

## 2019-01-14 NOTE — TELEPHONE ENCOUNTER
Patient calling back, stating that she received a message this morning to call back. Chart reviewed, labs still pending.  Advised patient that lab and ekg results would be called in to her tomorrow

## 2019-01-15 ENCOUNTER — PATIENT MESSAGE (OUTPATIENT)
Dept: FAMILY MEDICINE CLINIC | Facility: CLINIC | Age: 59
End: 2019-01-15

## 2019-01-15 DIAGNOSIS — I10 ESSENTIAL HYPERTENSION: ICD-10-CM

## 2019-01-15 DIAGNOSIS — Z82.49 FH: CAD (CORONARY ARTERY DISEASE): ICD-10-CM

## 2019-01-15 DIAGNOSIS — E66.9 OBESITY (BMI 30-39.9): ICD-10-CM

## 2019-01-15 DIAGNOSIS — E78.00 HYPERCHOLESTEREMIA: Primary | ICD-10-CM

## 2019-01-15 NOTE — TELEPHONE ENCOUNTER
From: Amaury Chisholm  To: Quang Steinberg MD  Sent: 1/15/2019 11:38 AM CST  Subject: Ashly Session Dr. Raine Land,  I am looking over my test results and am particularly concerned about the EKG result(s) and am not entirely sure I am understanding

## 2019-01-15 NOTE — TELEPHONE ENCOUNTER
Called patient and explained and review test results. Agree with a stress test given her risk factors as well as family history of heart disease. Will order stress echo. Patient agrees with the plan and will get this done.   She will follow-up in 3-6 mon

## 2019-02-05 ENCOUNTER — TELEPHONE (OUTPATIENT)
Dept: GASTROENTEROLOGY | Facility: CLINIC | Age: 59
End: 2019-02-05

## 2019-02-05 ENCOUNTER — NURSE ONLY (OUTPATIENT)
Dept: GASTROENTEROLOGY | Facility: CLINIC | Age: 59
End: 2019-02-05

## 2019-02-05 NOTE — TELEPHONE ENCOUNTER
Pt has a hx of HTN  Borderline controlled: 134/90 last read on 01/08/19  Taking blood pressure medications.  *Valsartan and hydrochlorothiazide   Pt is encouraged to lose weight    PCP ordered a EKG 22-KPON  COMP METABOLIC PANEL ordered as well     Last Pro

## 2019-02-05 NOTE — PROGRESS NOTES
Pt has a hx of HTN  Borderline controlled: 134/90 last read on 01/08/19  Taking blood pressure medications.  *Valsartan and hydrochlorothiazide   Pt is encouraged to lose weight    PCP ordered a EKG 56-RCTT  COMP METABOLIC PANEL ordered as well    Last Proc

## 2019-02-05 NOTE — TELEPHONE ENCOUNTER
Please see colon screening encounter. Patient has canceled her colonoscopy scheduled for today. Please call her to reschedule. Same orders as previous.

## 2019-02-05 NOTE — TELEPHONE ENCOUNTER
Okay to schedule colonoscopy, diagnosis family history of colon cancer, history of colon polyp, MAC at [facility] or IV sedation at [facility], split dose MiraLAX preparation

## 2019-03-08 RX ORDER — HYDROCHLOROTHIAZIDE 25 MG/1
25 TABLET ORAL
Qty: 90 TABLET | Refills: 0 | Status: SHIPPED | OUTPATIENT
Start: 2019-03-08 | End: 2019-06-09

## 2019-03-29 ENCOUNTER — TELEPHONE (OUTPATIENT)
Dept: DERMATOLOGY CLINIC | Facility: CLINIC | Age: 59
End: 2019-03-29

## 2019-03-29 NOTE — TELEPHONE ENCOUNTER
LOV 11/2017. Pt c/o same waxing and waning pruritic rash to buttocks, back, and thighs. Denies any respiratory issues. Pt requesting rx of Xyzal until appt on 4/18/19. Advised pt to contact PCP as LSS is out of office until 4/1.  Pt aware Urgent care is an

## 2019-03-29 NOTE — TELEPHONE ENCOUNTER
Pt states at last visit she had hives. She was put on medication for this. It helped. Needs a refill but appt on 4/18. Is there anything OTC she can use until then?  Please call

## 2019-03-30 ENCOUNTER — TELEPHONE (OUTPATIENT)
Dept: OTHER | Age: 59
End: 2019-03-30

## 2019-03-30 RX ORDER — LEVOCETIRIZINE DIHYDROCHLORIDE 5 MG/1
5 TABLET, FILM COATED ORAL DAILY
Qty: 30 TABLET | Refills: 0 | Status: SHIPPED | OUTPATIENT
Start: 2019-03-30 | End: 2019-05-26

## 2019-03-30 RX ORDER — LEVOCETIRIZINE DIHYDROCHLORIDE 5 MG/1
5 TABLET, FILM COATED ORAL DAILY
Qty: 30 TABLET | Refills: 0 | Status: SHIPPED | OUTPATIENT
Start: 2019-03-30 | End: 2019-03-30

## 2019-03-30 NOTE — TELEPHONE ENCOUNTER
Ok to refill the levocetrizine (Xyzal) x1- note that if hives are not controlled on 1 tab/day, studies show that it can safely be taken up to 4x/day if required- however, since recommended lovett is 1x/day, insurance will not cover more than 30 tabs- can also

## 2019-03-30 NOTE — TELEPHONE ENCOUNTER
LM that MJS refilled Rx as requested--was to call , but same # listed for him. Also sent patient message via [employer] that Rx was refilled, as incoming calls completed until Monday after 8 a.m.

## 2019-03-30 NOTE — TELEPHONE ENCOUNTER
Pt asking if Dr. Mike Pineda would be able to refill a prescription, Levoceterizine 5 mg. Med was prescribed by Dr. Jones Noland 11/28/17. Pt state she takes this medication for hives and was unable to get this refilled by Dr. Jones Noland.  Pt is having a flare up of hi

## 2019-03-30 NOTE — TELEPHONE ENCOUNTER
CHERYLTCB. Sent RF for Xyzal to pt's pharm per her request.  Confirmed in VM message this was done. Asked pt to pls CB Monday to so we can explain LSS recs. See message below.

## 2019-04-01 ENCOUNTER — TELEPHONE (OUTPATIENT)
Dept: DERMATOLOGY CLINIC | Facility: CLINIC | Age: 59
End: 2019-04-01

## 2019-04-05 RX ORDER — VALSARTAN 320 MG/1
320 TABLET ORAL
Qty: 90 TABLET | Refills: 0 | Status: SHIPPED | OUTPATIENT
Start: 2019-04-05 | End: 2019-07-25

## 2019-05-26 RX ORDER — LEVOCETIRIZINE DIHYDROCHLORIDE 5 MG/1
TABLET, FILM COATED ORAL
Qty: 90 TABLET | Refills: 0 | Status: SHIPPED | OUTPATIENT
Start: 2019-05-26 | End: 2019-09-06

## 2019-05-26 NOTE — TELEPHONE ENCOUNTER
Refilled per protocol  Refill Protocol Appointment Criteria  · Appointment scheduled in the past 12 months or in the next 3 months  Recent Outpatient Visits            3 months ago     [facility] GI PROCEDURE    Nurse Only    4 months ago Essential hypertension

## 2019-06-10 RX ORDER — HYDROCHLOROTHIAZIDE 25 MG/1
TABLET ORAL
Qty: 90 TABLET | Refills: 1 | Status: SHIPPED | OUTPATIENT
Start: 2019-06-10 | End: 2020-01-03

## 2019-07-25 RX ORDER — VALSARTAN 320 MG/1
320 TABLET ORAL
Qty: 90 TABLET | Refills: 1 | Status: SHIPPED | OUTPATIENT
Start: 2019-07-25 | End: 2020-01-21

## 2019-07-25 NOTE — TELEPHONE ENCOUNTER
Please review; protocol failed. Requested Prescriptions     Pending Prescriptions Disp Refills   • VALSARTAN 320 MG Oral Tab [Pharmacy Med Name: VALSARTAN 320 MG TABLET] 90 tablet 0     Sig: TAKE 1 TABLET (320 MG TOTAL) BY MOUTH ONCE DAILY.          Rece

## 2019-08-02 NOTE — TELEPHONE ENCOUNTER
CBLM to schedule procedure. Please transfer to Merline Doty at ext [phone] or [phone] for scheduling. Or please transfer to Pending sale to [facility] in GI if unavailable.

## 2019-08-26 ENCOUNTER — OFFICE VISIT (OUTPATIENT)
Dept: FAMILY MEDICINE CLINIC | Facility: CLINIC | Age: 59
End: 2019-08-26
Payer: COMMERCIAL

## 2019-08-26 VITALS
WEIGHT: 227 LBS | DIASTOLIC BLOOD PRESSURE: 93 MMHG | HEART RATE: 101 BPM | BODY MASS INDEX: 35.21 KG/M2 | TEMPERATURE: 98 F | SYSTOLIC BLOOD PRESSURE: 143 MMHG | HEIGHT: 67.5 IN

## 2019-08-26 DIAGNOSIS — F43.21 GRIEF REACTION: ICD-10-CM

## 2019-08-26 DIAGNOSIS — E78.00 HYPERCHOLESTEREMIA: ICD-10-CM

## 2019-08-26 DIAGNOSIS — Z80.0 FH: PANCREATIC CANCER: ICD-10-CM

## 2019-08-26 DIAGNOSIS — Z12.11 COLON CANCER SCREENING: ICD-10-CM

## 2019-08-26 DIAGNOSIS — R73.03 PREDIABETES: ICD-10-CM

## 2019-08-26 DIAGNOSIS — I10 ESSENTIAL HYPERTENSION: Primary | ICD-10-CM

## 2019-08-26 PROCEDURE — 99214 OFFICE O/P EST MOD 30 MIN: CPT | Performed by: FAMILY MEDICINE

## 2019-08-26 NOTE — PROGRESS NOTES
HPI:    Patient ID: Binnie Landau is a 63year old female. HPI  Patient presents with:  Medication Follow-Up: on BP would like to have one discontinued   Swelling: on left ankle     Patient here for follow-up on her blood pressure. She appears tearful. Allergies:No Known Allergies   PHYSICAL EXAM:   Patient presents with:  Medication Follow-Up: on BP would like to have one discontinued   Swelling: on left ankle      Physical Exam   Constitutional: She is oriented to person, place, and time.  She appea

## 2019-09-06 RX ORDER — LEVOCETIRIZINE DIHYDROCHLORIDE 5 MG/1
TABLET, FILM COATED ORAL
Qty: 90 TABLET | Refills: 0 | Status: SHIPPED | OUTPATIENT
Start: 2019-09-06 | End: 2020-01-14

## 2019-09-06 NOTE — TELEPHONE ENCOUNTER
Refill Protocol Appointment Criteria  · Appointment scheduled in the past 6 months or in the next 3 months  Recent Outpatient Visits            1 week ago Essential hypertension    Danna De La Rosa MD    Office Visit

## 2019-09-17 ENCOUNTER — APPOINTMENT (OUTPATIENT)
Dept: LAB | Age: 59
End: 2019-09-17
Attending: FAMILY MEDICINE
Payer: COMMERCIAL

## 2019-09-17 DIAGNOSIS — E78.00 HYPERCHOLESTEREMIA: ICD-10-CM

## 2019-09-17 DIAGNOSIS — R73.03 PREDIABETES: ICD-10-CM

## 2019-09-17 DIAGNOSIS — I10 ESSENTIAL HYPERTENSION: ICD-10-CM

## 2019-09-17 LAB
ALBUMIN SERPL-MCNC: 4 G/DL (ref 3.4–5)
ALBUMIN/GLOB SERPL: 1 {RATIO} (ref 1–2)
ALP LIVER SERPL-CCNC: 108 U/L (ref 46–118)
ALT SERPL-CCNC: 23 U/L (ref 13–56)
ANION GAP SERPL CALC-SCNC: 7 MMOL/L (ref 0–18)
AST SERPL-CCNC: 28 U/L (ref 15–37)
BILIRUB SERPL-MCNC: 0.5 MG/DL (ref 0.1–2)
BUN BLD-MCNC: 15 MG/DL (ref 7–18)
BUN/CREAT SERPL: 16.3 (ref 10–20)
CALCIUM BLD-MCNC: 9.6 MG/DL (ref 8.5–10.1)
CHLORIDE SERPL-SCNC: 103 MMOL/L (ref 98–112)
CHOLEST SMN-MCNC: 237 MG/DL (ref ?–200)
CO2 SERPL-SCNC: 29 MMOL/L (ref 21–32)
CREAT BLD-MCNC: 0.92 MG/DL (ref 0.55–1.02)
EST. AVERAGE GLUCOSE BLD GHB EST-MCNC: 126 MG/DL (ref 68–126)
GLOBULIN PLAS-MCNC: 4 G/DL (ref 2.8–4.4)
GLUCOSE BLD-MCNC: 91 MG/DL (ref 70–99)
HBA1C MFR BLD HPLC: 6 % (ref ?–5.7)
HDLC SERPL-MCNC: 80 MG/DL (ref 40–59)
LDLC SERPL CALC-MCNC: 141 MG/DL (ref ?–100)
M PROTEIN MFR SERPL ELPH: 8 G/DL (ref 6.4–8.2)
NONHDLC SERPL-MCNC: 157 MG/DL (ref ?–130)
OSMOLALITY SERPL CALC.SUM OF ELEC: 288 MOSM/KG (ref 275–295)
PATIENT FASTING: YES
PATIENT FASTING: YES
POTASSIUM SERPL-SCNC: 4 MMOL/L (ref 3.5–5.1)
SODIUM SERPL-SCNC: 139 MMOL/L (ref 136–145)
TRIGL SERPL-MCNC: 79 MG/DL (ref 30–149)
VLDLC SERPL CALC-MCNC: 16 MG/DL (ref 0–30)

## 2019-09-17 PROCEDURE — 80061 LIPID PANEL: CPT

## 2019-09-17 PROCEDURE — 80053 COMPREHEN METABOLIC PANEL: CPT

## 2019-09-17 PROCEDURE — 83036 HEMOGLOBIN GLYCOSYLATED A1C: CPT

## 2019-09-17 PROCEDURE — 36415 COLL VENOUS BLD VENIPUNCTURE: CPT

## 2019-09-18 DIAGNOSIS — Z82.49 FH: CAD (CORONARY ARTERY DISEASE): ICD-10-CM

## 2019-09-18 DIAGNOSIS — I10 ESSENTIAL HYPERTENSION: ICD-10-CM

## 2019-09-18 DIAGNOSIS — E78.00 HYPERCHOLESTEREMIA: Primary | ICD-10-CM

## 2019-10-08 ENCOUNTER — HOSPITAL ENCOUNTER (OUTPATIENT)
Dept: CV DIAGNOSTICS | Facility: HOSPITAL | Age: 59
Discharge: HOME | End: 2019-10-08
Attending: FAMILY MEDICINE
Payer: COMMERCIAL

## 2019-10-08 DIAGNOSIS — I10 ESSENTIAL HYPERTENSION: ICD-10-CM

## 2019-10-08 DIAGNOSIS — E78.00 HYPERCHOLESTEREMIA: ICD-10-CM

## 2019-10-08 DIAGNOSIS — E66.9 OBESITY (BMI 30-39.9): ICD-10-CM

## 2019-10-08 DIAGNOSIS — Z82.49 FH: CAD (CORONARY ARTERY DISEASE): ICD-10-CM

## 2019-10-08 PROCEDURE — 93017 CV STRESS TEST TRACING ONLY: CPT | Performed by: FAMILY MEDICINE

## 2019-10-08 PROCEDURE — 93350 STRESS TTE ONLY: CPT | Performed by: FAMILY MEDICINE

## 2019-10-08 PROCEDURE — 93018 CV STRESS TEST I&R ONLY: CPT | Performed by: FAMILY MEDICINE

## 2019-10-08 PROCEDURE — 93016 CV STRESS TEST SUPVJ ONLY: CPT | Performed by: FAMILY MEDICINE

## 2019-10-10 ENCOUNTER — TELEPHONE (OUTPATIENT)
Dept: OTHER | Age: 59
End: 2019-10-10

## 2019-10-10 NOTE — TELEPHONE ENCOUNTER
Patient called in today for result of her stress test and echo. Advised both are within normal limits. Advised Dr. Nikia Keller ordered CT Calcium score test may be scheduled anytime.

## 2019-10-11 ENCOUNTER — PATIENT MESSAGE (OUTPATIENT)
Dept: FAMILY MEDICINE CLINIC | Facility: CLINIC | Age: 59
End: 2019-10-11

## 2019-10-11 NOTE — TELEPHONE ENCOUNTER
From: Fly Martinez  To: Elder Ba MD  Sent: 10/11/2019 10:00 AM CDT  Subject: Test Results Question    Hi Dr. Remonia Kehr,  I hope you and your family are well.  I understand the stress test results appear to be normal?     The RN said there was some other te

## 2019-11-26 NOTE — TELEPHONE ENCOUNTER
Pt seeing Dr. Reinier Armijo for OV/consult on 1/13/20. Will schedule pt at that time if needed. TE closed.

## 2020-01-03 RX ORDER — HYDROCHLOROTHIAZIDE 25 MG/1
TABLET ORAL
Qty: 90 TABLET | Refills: 1 | Status: SHIPPED | OUTPATIENT
Start: 2020-01-03 | End: 2020-07-15

## 2020-01-15 RX ORDER — LEVOCETIRIZINE DIHYDROCHLORIDE 5 MG/1
TABLET, FILM COATED ORAL
Qty: 90 TABLET | Refills: 1 | Status: SHIPPED | OUTPATIENT
Start: 2020-01-15 | End: 2020-10-15

## 2020-01-15 NOTE — TELEPHONE ENCOUNTER
Review pended refill request as it does not fall under a protocol.   Requested Prescriptions     Pending Prescriptions Disp Refills   • LEVOCETIRIZINE DIHYDROCHLORIDE 5 MG Oral Tab [Pharmacy Med Name: LEVOCETIRIZINE 5 MG TABLET] 90 tablet 0     Sig: TAKE 1

## 2020-01-21 RX ORDER — VALSARTAN 320 MG/1
320 TABLET ORAL
Qty: 90 TABLET | Refills: 1 | Status: SHIPPED | OUTPATIENT
Start: 2020-01-21 | End: 2020-07-23

## 2020-01-22 NOTE — TELEPHONE ENCOUNTER
Refill passed per [facility] protocol.   Hypertensive Medications  Protocol Criteria:  · Appointment scheduled in the past 6 months or in the next 3 months  · BMP or CMP in the past 12 months  · Creatinine result < 2  Recent Outpatient Visits

## 2020-02-05 NOTE — PROGRESS NOTES
Arie Noel    7/8/1960       Patient presents with:  Gyn Problem: MENOPAUSAL ISSUES  she lost her mother 6 months ago and is very tearful about this today-pancreatic cancer here at [address] [place] [address].   We discussed her obvious severe grief and coping mechanisms and

## 2020-07-15 RX ORDER — HYDROCHLOROTHIAZIDE 25 MG/1
TABLET ORAL
Qty: 90 TABLET | Refills: 0 | Status: SHIPPED | OUTPATIENT
Start: 2020-07-15 | End: 2020-07-23

## 2020-07-23 ENCOUNTER — OFFICE VISIT (OUTPATIENT)
Dept: FAMILY MEDICINE CLINIC | Facility: CLINIC | Age: 60
End: 2020-07-23
Payer: COMMERCIAL

## 2020-07-23 VITALS
TEMPERATURE: 97 F | DIASTOLIC BLOOD PRESSURE: 80 MMHG | HEART RATE: 101 BPM | WEIGHT: 235 LBS | HEIGHT: 67.5 IN | BODY MASS INDEX: 36.45 KG/M2 | SYSTOLIC BLOOD PRESSURE: 119 MMHG

## 2020-07-23 DIAGNOSIS — E55.9 VITAMIN D DEFICIENCY: ICD-10-CM

## 2020-07-23 DIAGNOSIS — Z80.0 FH: COLON CANCER: ICD-10-CM

## 2020-07-23 DIAGNOSIS — E66.9 OBESITY (BMI 30-39.9): ICD-10-CM

## 2020-07-23 DIAGNOSIS — R73.03 PREDIABETES: ICD-10-CM

## 2020-07-23 DIAGNOSIS — Z12.11 COLON CANCER SCREENING: ICD-10-CM

## 2020-07-23 DIAGNOSIS — I10 ESSENTIAL HYPERTENSION: Primary | ICD-10-CM

## 2020-07-23 DIAGNOSIS — E78.00 HYPERCHOLESTEREMIA: ICD-10-CM

## 2020-07-23 PROCEDURE — 3008F BODY MASS INDEX DOCD: CPT | Performed by: FAMILY MEDICINE

## 2020-07-23 PROCEDURE — 3079F DIAST BP 80-89 MM HG: CPT | Performed by: FAMILY MEDICINE

## 2020-07-23 PROCEDURE — 3074F SYST BP LT 130 MM HG: CPT | Performed by: FAMILY MEDICINE

## 2020-07-23 PROCEDURE — 99214 OFFICE O/P EST MOD 30 MIN: CPT | Performed by: FAMILY MEDICINE

## 2020-07-23 RX ORDER — VALSARTAN 320 MG/1
320 TABLET ORAL
Qty: 90 TABLET | Refills: 3 | Status: SHIPPED | OUTPATIENT
Start: 2020-07-23 | End: 2021-05-30

## 2020-07-23 RX ORDER — HYDROCHLOROTHIAZIDE 25 MG/1
25 TABLET ORAL DAILY
Qty: 90 TABLET | Refills: 3 | Status: SHIPPED | OUTPATIENT
Start: 2020-07-23 | End: 2021-05-30

## 2020-07-23 NOTE — PROGRESS NOTES
HPI:    Patient ID: Tisha Benson is a 62year old female. HPI  Patient presents with:  Cholesterol: follow up   Blood Pressure: follow up     Patient states overall she is doing well. She has been busy with gardening.   Her daughter is working from [employer] She is alert and oriented to person, place, and time. Skin: Skin is warm and dry. No rash noted. ASSESSMENT/PLAN:   Essential hypertension  (primary encounter diagnosis)  Hypercholesteremia  Obesity (bmi 30-39. 9)  Fh: colon cancer  Vitamin d

## 2020-07-24 ENCOUNTER — APPOINTMENT (OUTPATIENT)
Dept: LAB | Age: 60
End: 2020-07-24
Attending: FAMILY MEDICINE
Payer: COMMERCIAL

## 2020-07-24 DIAGNOSIS — E78.00 HYPERCHOLESTEREMIA: ICD-10-CM

## 2020-07-24 DIAGNOSIS — E55.9 VITAMIN D DEFICIENCY: ICD-10-CM

## 2020-07-24 DIAGNOSIS — R73.03 PREDIABETES: ICD-10-CM

## 2020-07-24 LAB
ALBUMIN SERPL-MCNC: 3.8 G/DL (ref 3.4–5)
ALBUMIN/GLOB SERPL: 1 {RATIO} (ref 1–2)
ALP LIVER SERPL-CCNC: 109 U/L (ref 46–118)
ALT SERPL-CCNC: 23 U/L (ref 13–56)
ANION GAP SERPL CALC-SCNC: 6 MMOL/L (ref 0–18)
AST SERPL-CCNC: 23 U/L (ref 15–37)
BILIRUB SERPL-MCNC: 0.7 MG/DL (ref 0.1–2)
BUN BLD-MCNC: 12 MG/DL (ref 7–18)
BUN/CREAT SERPL: 12.5 (ref 10–20)
CALCIUM BLD-MCNC: 9.7 MG/DL (ref 8.5–10.1)
CHLORIDE SERPL-SCNC: 103 MMOL/L (ref 98–112)
CHOLEST SMN-MCNC: 214 MG/DL (ref ?–200)
CO2 SERPL-SCNC: 31 MMOL/L (ref 21–32)
CREAT BLD-MCNC: 0.96 MG/DL (ref 0.55–1.02)
EST. AVERAGE GLUCOSE BLD GHB EST-MCNC: 128 MG/DL (ref 68–126)
GLOBULIN PLAS-MCNC: 3.9 G/DL (ref 2.8–4.4)
GLUCOSE BLD-MCNC: 88 MG/DL (ref 70–99)
HBA1C MFR BLD HPLC: 6.1 % (ref ?–5.7)
HDLC SERPL-MCNC: 77 MG/DL (ref 40–59)
LDLC SERPL CALC-MCNC: 125 MG/DL (ref ?–100)
M PROTEIN MFR SERPL ELPH: 7.7 G/DL (ref 6.4–8.2)
NONHDLC SERPL-MCNC: 137 MG/DL (ref ?–130)
OSMOLALITY SERPL CALC.SUM OF ELEC: 289 MOSM/KG (ref 275–295)
PATIENT FASTING Y/N/NP: YES
PATIENT FASTING Y/N/NP: YES
POTASSIUM SERPL-SCNC: 4.6 MMOL/L (ref 3.5–5.1)
SODIUM SERPL-SCNC: 140 MMOL/L (ref 136–145)
TRIGL SERPL-MCNC: 62 MG/DL (ref 30–149)
VLDLC SERPL CALC-MCNC: 12 MG/DL (ref 0–30)

## 2020-07-24 PROCEDURE — 80053 COMPREHEN METABOLIC PANEL: CPT

## 2020-07-24 PROCEDURE — 82306 VITAMIN D 25 HYDROXY: CPT

## 2020-07-24 PROCEDURE — 36415 COLL VENOUS BLD VENIPUNCTURE: CPT

## 2020-07-24 PROCEDURE — 83036 HEMOGLOBIN GLYCOSYLATED A1C: CPT

## 2020-07-24 PROCEDURE — 80061 LIPID PANEL: CPT

## 2020-07-27 LAB — 25(OH)D3 SERPL-MCNC: 30.3 NG/ML (ref 30–100)

## 2020-10-02 ENCOUNTER — HOSPITAL ENCOUNTER (OUTPATIENT)
Dept: MAMMOGRAPHY | Age: 60
Discharge: HOME | End: 2020-10-02
Attending: OBSTETRICS & GYNECOLOGY
Payer: COMMERCIAL

## 2020-10-02 DIAGNOSIS — Z12.31 VISIT FOR SCREENING MAMMOGRAM: ICD-10-CM

## 2020-10-02 PROCEDURE — 77063 BREAST TOMOSYNTHESIS BI: CPT | Performed by: OBSTETRICS & GYNECOLOGY

## 2020-10-02 PROCEDURE — 77067 SCR MAMMO BI INCL CAD: CPT | Performed by: OBSTETRICS & GYNECOLOGY

## 2020-10-08 ENCOUNTER — HOSPITAL ENCOUNTER (OUTPATIENT)
Dept: CT IMAGING | Age: 60
Discharge: HOME | End: 2020-10-08
Attending: FAMILY MEDICINE

## 2020-10-08 DIAGNOSIS — Z82.49 FH: CAD (CORONARY ARTERY DISEASE): ICD-10-CM

## 2020-10-08 DIAGNOSIS — I10 ESSENTIAL HYPERTENSION: ICD-10-CM

## 2020-10-08 DIAGNOSIS — R91.1 NODULE OF LOWER LOBE OF LEFT LUNG: Primary | ICD-10-CM

## 2020-10-08 DIAGNOSIS — K44.9 HIATAL HERNIA: ICD-10-CM

## 2020-10-08 DIAGNOSIS — E78.00 HYPERCHOLESTEREMIA: ICD-10-CM

## 2020-10-13 ENCOUNTER — PATIENT MESSAGE (OUTPATIENT)
Dept: FAMILY MEDICINE CLINIC | Facility: CLINIC | Age: 60
End: 2020-10-13

## 2020-10-13 DIAGNOSIS — R91.1 LUNG NODULE SEEN ON IMAGING STUDY: Primary | ICD-10-CM

## 2020-10-14 NOTE — TELEPHONE ENCOUNTER
From: Lennox Llano  To: Priscilla Owen MD  Sent: 10/13/2020 12:34 PM CDT  Subject: Non-Urgent Medical Question    Dear Dr. Eloina Owen,    I hope you and your family are doing well.     Following my CT Scan Report, I would like to see a pulmonologist; is there one

## 2020-10-15 RX ORDER — LEVOCETIRIZINE DIHYDROCHLORIDE 5 MG/1
5 TABLET, FILM COATED ORAL DAILY
Qty: 90 TABLET | Refills: 3 | Status: SHIPPED | OUTPATIENT
Start: 2020-10-15 | End: 2021-12-20

## 2020-10-17 DIAGNOSIS — R93.1 ELEVATED CORONARY ARTERY CALCIUM SCORE: ICD-10-CM

## 2020-10-17 DIAGNOSIS — Z82.49 FH: CAD (CORONARY ARTERY DISEASE): Primary | ICD-10-CM

## 2020-10-22 ENCOUNTER — OFFICE VISIT (OUTPATIENT)
Dept: FAMILY MEDICINE CLINIC | Facility: CLINIC | Age: 60
End: 2020-10-22
Payer: COMMERCIAL

## 2020-10-22 VITALS
BODY MASS INDEX: 37.39 KG/M2 | WEIGHT: 241 LBS | SYSTOLIC BLOOD PRESSURE: 128 MMHG | DIASTOLIC BLOOD PRESSURE: 84 MMHG | TEMPERATURE: 98 F | HEART RATE: 98 BPM | HEIGHT: 67.5 IN

## 2020-10-22 DIAGNOSIS — Z82.49 FH: CAD (CORONARY ARTERY DISEASE): ICD-10-CM

## 2020-10-22 DIAGNOSIS — E55.9 VITAMIN D DEFICIENCY: ICD-10-CM

## 2020-10-22 DIAGNOSIS — R93.1 ELEVATED CORONARY ARTERY CALCIUM SCORE: ICD-10-CM

## 2020-10-22 DIAGNOSIS — R91.1 NODULE OF LOWER LOBE OF LEFT LUNG: ICD-10-CM

## 2020-10-22 DIAGNOSIS — K44.9 HIATAL HERNIA: ICD-10-CM

## 2020-10-22 DIAGNOSIS — Z00.00 WELL ADULT EXAM: Primary | ICD-10-CM

## 2020-10-22 DIAGNOSIS — Z80.0 FH: COLON CANCER: ICD-10-CM

## 2020-10-22 DIAGNOSIS — E78.00 HYPERCHOLESTEREMIA: ICD-10-CM

## 2020-10-22 DIAGNOSIS — E66.9 OBESITY (BMI 30-39.9): ICD-10-CM

## 2020-10-22 DIAGNOSIS — I10 ESSENTIAL HYPERTENSION: ICD-10-CM

## 2020-10-22 DIAGNOSIS — R73.03 PREDIABETES: ICD-10-CM

## 2020-10-22 PROCEDURE — 3074F SYST BP LT 130 MM HG: CPT | Performed by: FAMILY MEDICINE

## 2020-10-22 PROCEDURE — 90686 IIV4 VACC NO PRSV 0.5 ML IM: CPT | Performed by: FAMILY MEDICINE

## 2020-10-22 PROCEDURE — 99396 PREV VISIT EST AGE 40-64: CPT | Performed by: FAMILY MEDICINE

## 2020-10-22 PROCEDURE — 3008F BODY MASS INDEX DOCD: CPT | Performed by: FAMILY MEDICINE

## 2020-10-22 PROCEDURE — 3079F DIAST BP 80-89 MM HG: CPT | Performed by: FAMILY MEDICINE

## 2020-10-22 PROCEDURE — 90471 IMMUNIZATION ADMIN: CPT | Performed by: FAMILY MEDICINE

## 2020-10-22 NOTE — PROGRESS NOTES
HPI:    Patient ID: Nick Hernandez is a 62year old female. HPI  Patient presents with: Well Adult: sees gyne     overall doing well    Denies any cough or shortness of breath.   Incidental findings on her recent CT calcium score test.  She does have jessica nervous/anxious.         /84   Pulse 98   Temp 98 °F (36.7 °C) (Temporal)   Ht 5' 7.5\" (1.715 m)   Wt 241 lb (109.3 kg)   BMI 37.19 kg/m²     Past Medical History:   Diagnosis Date   • Other and unspecified hyperlipidemia    • Primary hypothyroidism Transfusions: Not Asked        Caffeine Concern: Yes          coffee        Occupational Exposure: Not Asked        Hobby Hazards: Not Asked        Sleep Concern: Not Asked        Stress Concern: Not Asked        Weight Concern: Not Asked        Special Alicia Jenkins Allergies   PHYSICAL EXAM:   Patient presents with: Well Adult: sees gyne      Physical Exam   Constitutional: She is oriented to person, place, and time. She appears well-developed and well-nourished. HENT:   Head: Normocephalic and atraumatic.    Right Future  - COMP METABOLIC PANEL (14); Future  - LIPID PANEL; Future  - TSH W REFLEX TO FREE T4; Future    2. Hypercholesteremia  Check labs    3. Essential hypertension  Well controlled    4. Vitamin D deficiency  Controlled  Continue replacement    5.  Obes

## 2020-10-30 ENCOUNTER — OFFICE VISIT (OUTPATIENT)
Dept: CARDIOLOGY CLINIC | Facility: CLINIC | Age: 60
End: 2020-10-30
Payer: COMMERCIAL

## 2020-10-30 VITALS — HEIGHT: 67.5 IN | WEIGHT: 243 LBS | BODY MASS INDEX: 37.69 KG/M2

## 2020-10-30 DIAGNOSIS — I10 ESSENTIAL HYPERTENSION: Primary | ICD-10-CM

## 2020-10-30 PROCEDURE — 3008F BODY MASS INDEX DOCD: CPT | Performed by: NURSE PRACTITIONER

## 2020-10-30 PROCEDURE — 99213 OFFICE O/P EST LOW 20 MIN: CPT | Performed by: NURSE PRACTITIONER

## 2020-10-30 PROCEDURE — 93000 ELECTROCARDIOGRAM COMPLETE: CPT | Performed by: NURSE PRACTITIONER

## 2020-10-30 RX ORDER — FOLIC ACID/MULTIVIT,IRON,MINER 0.4MG-18MG
1200 TABLET ORAL DAILY
COMMUNITY
Start: 2020-10-30 | End: 2021-08-11

## 2020-10-30 NOTE — PROGRESS NOTES
Cristina Nobles is a 62year old female. Patient presents with:  Consult: Abnormal CT calcium score, feels well, denies chest pain,SOB, dizziness    HPI:   Patient comes in today for consultation.  She sees Dr. Pk Villagran she has a history of hypertension borderline lesions or rashes  RESPIRATORY: denies shortness of breath with exertion  CARDIOVASCULAR: no chest pain  GI: denies abdominal pain and denies heartburn  NEURO: denies headaches    EXAM:   Ht 5' 7.5\" (1.715 m)   Wt 243 lb (110.2 kg)   BMI 37.50 kg/m²   GEN

## 2020-11-02 ENCOUNTER — OFFICE VISIT (OUTPATIENT)
Dept: PODIATRY CLINIC | Facility: CLINIC | Age: 60
End: 2020-11-02
Payer: COMMERCIAL

## 2020-11-02 DIAGNOSIS — M20.41 HAMMER TOES OF BOTH FEET: ICD-10-CM

## 2020-11-02 DIAGNOSIS — L84 CALLUS OF FOOT: ICD-10-CM

## 2020-11-02 DIAGNOSIS — M20.11 VALGUS DEFORMITY OF BOTH GREAT TOES: ICD-10-CM

## 2020-11-02 DIAGNOSIS — M20.12 VALGUS DEFORMITY OF BOTH GREAT TOES: ICD-10-CM

## 2020-11-02 DIAGNOSIS — M20.42 HAMMER TOES OF BOTH FEET: ICD-10-CM

## 2020-11-02 DIAGNOSIS — B35.1 ONYCHOMYCOSIS: Primary | ICD-10-CM

## 2020-11-02 PROCEDURE — 99214 OFFICE O/P EST MOD 30 MIN: CPT | Performed by: PODIATRIST

## 2020-11-02 RX ORDER — ASPIRIN 81 MG/1
81 TABLET, CHEWABLE ORAL DAILY
COMMUNITY

## 2020-11-02 NOTE — PROGRESS NOTES
Binnie Landau is a 62year old female. Patient presents with:  Consult: Left Foot Tingeling on bottom and toes. No consistantly. More often, Callus.  - 0/10 Pain        HPI:   This patient presents to the clinic she is prediabetic but not been diagnosed wit ELECTROCARDIOGRAM, COMPLETE  11/09/2013    scanned to media tab, 11/09/2013      Family History   Problem Relation Age of Onset   • Hypertension Father    • Cancer Father         intestinal cancer   • Hypertension Mother    • Diabetes Mother    • Other (pa has intact sensorium there are no deficits noted.    4. Musculoskeletal: The patient has good muscle strength she has lateral deviation of the hallux bilaterally consistent with a bunion deformity that does not appear to be rigidly track bound and there is

## 2020-11-06 ENCOUNTER — HOSPITAL ENCOUNTER (OUTPATIENT)
Dept: ULTRASOUND IMAGING | Age: 60
Discharge: HOME | End: 2020-11-06
Attending: FAMILY MEDICINE

## 2020-11-06 DIAGNOSIS — Z13.9 ENCOUNTER FOR SCREENING: ICD-10-CM

## 2020-11-13 ENCOUNTER — TELEPHONE (OUTPATIENT)
Dept: PODIATRY CLINIC | Facility: CLINIC | Age: 60
End: 2020-11-13

## 2020-11-13 DIAGNOSIS — B35.1 ONYCHOMYCOSIS: Primary | ICD-10-CM

## 2020-11-13 NOTE — TELEPHONE ENCOUNTER
----- Message from Daryl Rogers DPM sent at 11/9/2020 12:57 PM CST -----  Results reviewed. Please inform patient that fungal culture results are positive and we should proceed with Lamisil tablet therapy.   If the patient agrees we have to do a hepa

## 2020-12-03 ENCOUNTER — OFFICE VISIT (OUTPATIENT)
Dept: PULMONOLOGY | Facility: CLINIC | Age: 60
End: 2020-12-03
Payer: COMMERCIAL

## 2020-12-03 VITALS
RESPIRATION RATE: 18 BRPM | SYSTOLIC BLOOD PRESSURE: 152 MMHG | TEMPERATURE: 97 F | BODY MASS INDEX: 37.59 KG/M2 | HEIGHT: 68 IN | OXYGEN SATURATION: 97 % | DIASTOLIC BLOOD PRESSURE: 99 MMHG | WEIGHT: 248 LBS | HEART RATE: 103 BPM

## 2020-12-03 DIAGNOSIS — R91.1 LUNG NODULE: Primary | ICD-10-CM

## 2020-12-03 PROCEDURE — 3008F BODY MASS INDEX DOCD: CPT | Performed by: INTERNAL MEDICINE

## 2020-12-03 PROCEDURE — 3080F DIAST BP >= 90 MM HG: CPT | Performed by: INTERNAL MEDICINE

## 2020-12-03 PROCEDURE — 99244 OFF/OP CNSLTJ NEW/EST MOD 40: CPT | Performed by: INTERNAL MEDICINE

## 2020-12-03 PROCEDURE — 3077F SYST BP >= 140 MM HG: CPT | Performed by: INTERNAL MEDICINE

## 2020-12-03 NOTE — H&P
Referring Physician  Monisha Tamez. Chhaya Minor MD    Chief Complaint  Lung nodule    History of Present Illness  Patient is a very pleasant 42-year-old female who presents to pulmonary clinic for initial visit.   She had incidental finding of lung nodule seen on recent Take 500 mg by mouth daily. , Disp: , Rfl:     •  Chromium 400 MCG Oral Tab, Take 395 mcg by mouth. , Disp: , Rfl:     •  Magnesium 100 MG Oral Cap, Take 300 mg by mouth daily.  , Disp: , Rfl:     •  Alpha-Lipoic Acid 50 MG Oral Tab, Take 50 mg by mouth jose nodule malignancy appears to be extremely unlikely. No significant history of tobacco abuse or known lung disease.   Agreeable with CT chest which has been ordered for October 2021 especially in light that CT cardiac did not evaluate entirety of the lung f

## 2020-12-07 ENCOUNTER — TELEPHONE (OUTPATIENT)
Dept: GASTROENTEROLOGY | Facility: CLINIC | Age: 60
End: 2020-12-07

## 2020-12-07 ENCOUNTER — OFFICE VISIT (OUTPATIENT)
Dept: GASTROENTEROLOGY | Facility: CLINIC | Age: 60
End: 2020-12-07
Payer: COMMERCIAL

## 2020-12-07 VITALS
HEIGHT: 67.5 IN | DIASTOLIC BLOOD PRESSURE: 90 MMHG | SYSTOLIC BLOOD PRESSURE: 140 MMHG | WEIGHT: 243 LBS | BODY MASS INDEX: 37.69 KG/M2 | HEART RATE: 112 BPM

## 2020-12-07 DIAGNOSIS — Z80.0 FH: COLON CANCER: ICD-10-CM

## 2020-12-07 DIAGNOSIS — Z80.0 FAMILY HISTORY OF COLON CANCER: ICD-10-CM

## 2020-12-07 DIAGNOSIS — Z86.010 HISTORY OF COLON POLYPS: Primary | ICD-10-CM

## 2020-12-07 DIAGNOSIS — Z86.010 PERSONAL HISTORY OF COLONIC POLYPS: Primary | ICD-10-CM

## 2020-12-07 DIAGNOSIS — Z12.11 ENCOUNTER FOR SCREENING COLONOSCOPY: Primary | ICD-10-CM

## 2020-12-07 PROCEDURE — 3077F SYST BP >= 140 MM HG: CPT | Performed by: INTERNAL MEDICINE

## 2020-12-07 PROCEDURE — 99244 OFF/OP CNSLTJ NEW/EST MOD 40: CPT | Performed by: INTERNAL MEDICINE

## 2020-12-07 PROCEDURE — 3080F DIAST BP >= 90 MM HG: CPT | Performed by: INTERNAL MEDICINE

## 2020-12-07 PROCEDURE — 3008F BODY MASS INDEX DOCD: CPT | Performed by: INTERNAL MEDICINE

## 2020-12-07 NOTE — TELEPHONE ENCOUNTER
Patient requesting to reschedule colonoscopy, patient was seen in clinic today. Patient informed about the 72 hour call back. Please call at:[phone],thanks.   *ok to leave detailed message, asking for the following week 1/11 onward

## 2020-12-07 NOTE — PATIENT INSTRUCTIONS
1. Schedule colonoscopy with MAC sedation for screening [Diagnosis: surveillance for history of polyps, family history of colon CA]    2. Miralax prep    3.  Continue all medications for procedure except    ** If MAC @ [facility]/NE:    - HOLD ACE/ARBs the night b

## 2020-12-07 NOTE — H&P
[address] [facility] [address] Gastroenterology                                                                                                  Clinic History and Physical     Pa risks, benefits and  alternatives of the procedure to the patient. PREOPERATIVE SEDATION:  Fentanyl 50 mcg IV push, Versed 2.0 mg IV   push.      INTRAOPERATIVE SEDATION:  Fentanyl 50 mcg IV push in 25 mcg   divided  increments, Versed 4.0 mg IV push in • Hypertension Father    • Cancer Father         intestinal cancer   • Hypertension Mother    • Diabetes Mother    • Cancer Mother         pancreas   • Other (pancreatic canc) Mother    • Hypertension Other    • Heart Disease Other    • Heart Disorder Ma fever  ENDOCRINE:  negative for cold intolerance and heat intolerance  MUSCULOSKELETAL:  negative for joint effusion/severe erythema  BEHAVIOR/PSYCH:  negative for psychotic behavior      PHYSICAL EXAM:   Blood pressure (!) 150/98, pulse 112, height 5' 7.5 meds: Denies    ** If MAC @ [facility]/NE:    - HOLD ACE/ARBs the night before and the day of the procedure(s) -- LOSARTAN   - NO alcohol, recreational drugs nor erectile dysfunction mediations 24 hours before procedure(s)   - NO herbal supplements or weight loss

## 2020-12-07 NOTE — TELEPHONE ENCOUNTER
Scheduled for:  Colonoscopy 95[address]  Provider Name:  Dr. Moura Rank  Date:  1/5/2021  Location:  [facility]  Sedation:  MAC  Time:  3:30 pm, (pt is aware that [facility] will call the day before to confirm arrival time)  Prep:  Miralax  Meds/Allergies Reconciled?:  Physician

## 2020-12-08 NOTE — TELEPHONE ENCOUNTER
Scheduled for:  Colonoscopy - [address]  Provider Name:  Dr. Girish Moody  Date:  FROM - 1/5/21             TO - 1/19/21  Location:  [facility]  Sedation:  MAC  Time:  FROM - 3:30 pm              TO - Approx 11:30 am (pt is aware that [facility] with arrival time)  Prep:

## 2020-12-09 NOTE — TELEPHONE ENCOUNTER
Let the patient know Mariana happy to prescribe Cornelious Ridge however all topical medications only have about a 20% effective rate and she will have to use it for up to a year if she wants to try that she is faithful with it it has worked.   And if she wants it don Pt seen with resident. Agree with above.  Goal as above.  Will follow along prn symptoms.

## 2020-12-17 ENCOUNTER — OFFICE VISIT (OUTPATIENT)
Dept: OBGYN CLINIC | Facility: CLINIC | Age: 60
End: 2020-12-17
Payer: COMMERCIAL

## 2020-12-17 VITALS
SYSTOLIC BLOOD PRESSURE: 142 MMHG | HEART RATE: 112 BPM | WEIGHT: 240 LBS | BODY MASS INDEX: 37.67 KG/M2 | HEIGHT: 67 IN | DIASTOLIC BLOOD PRESSURE: 87 MMHG

## 2020-12-17 DIAGNOSIS — Z01.419 ENCOUNTER FOR GYNECOLOGICAL EXAMINATION WITHOUT ABNORMAL FINDING: Primary | ICD-10-CM

## 2020-12-17 PROCEDURE — 99396 PREV VISIT EST AGE 40-64: CPT | Performed by: OBSTETRICS & GYNECOLOGY

## 2020-12-17 PROCEDURE — 3079F DIAST BP 80-89 MM HG: CPT | Performed by: OBSTETRICS & GYNECOLOGY

## 2020-12-17 PROCEDURE — 3077F SYST BP >= 140 MM HG: CPT | Performed by: OBSTETRICS & GYNECOLOGY

## 2020-12-17 PROCEDURE — 3008F BODY MASS INDEX DOCD: CPT | Performed by: OBSTETRICS & GYNECOLOGY

## 2020-12-17 NOTE — PROGRESS NOTES
Jaison Posey is a 62year old female  No LMP recorded. (Menstrual status: Menopause). who presents for Patient presents with:  Gyn Exam: ANNUAL EXAM    She has no complaints. She is scheduled for coonoscopy 2020.     OBSTETRICS HISTORY:  OB Hi partner violence        Fear of current or ex partner: Not on file        Emotionally abused: Not on file        Physically abused: Not on file        Forced sexual activity: Not on file    Other Topics      Concerns:         Service: Not Asked Multiple Vitamins-Minerals (DIABETES HEALTH FORMULA OR), Take by mouth daily. , Disp: , Rfl:   •  Levocetirizine Dihydrochloride 5 MG Oral Tab, Take 1 tablet (5 mg total) by mouth daily. , Disp: 90 tablet, Rfl: 3  •  hydrochlorothiazide 25 MG Oral Tab, Take situation.  Appropriate mood and affect    Pelvic Exam:  External Genitalia: normal appearance, hair distribution, and no lesions  Urethral Meatus:  normal in size, location, without lesions and prolapse  Bladder:  No fullness, masses or tenderness  Vagina:

## 2021-01-09 ENCOUNTER — LAB ENCOUNTER (OUTPATIENT)
Dept: LAB | Age: 61
End: 2021-01-09
Attending: FAMILY MEDICINE
Payer: COMMERCIAL

## 2021-01-09 DIAGNOSIS — B35.1 ONYCHOMYCOSIS: ICD-10-CM

## 2021-01-09 DIAGNOSIS — Z00.00 WELL ADULT EXAM: ICD-10-CM

## 2021-01-09 LAB
ALBUMIN SERPL-MCNC: 3.7 G/DL (ref 3.4–5)
ALBUMIN/GLOB SERPL: 0.9 {RATIO} (ref 1–2)
ALP LIVER SERPL-CCNC: 129 U/L
ALT SERPL-CCNC: 27 U/L
ANION GAP SERPL CALC-SCNC: 3 MMOL/L (ref 0–18)
AST SERPL-CCNC: 20 U/L (ref 15–37)
BASOPHILS # BLD AUTO: 0.06 X10(3) UL (ref 0–0.2)
BASOPHILS NFR BLD AUTO: 1 %
BILIRUB DIRECT SERPL-MCNC: <0.1 MG/DL (ref 0–0.2)
BILIRUB SERPL-MCNC: 0.5 MG/DL (ref 0.1–2)
BUN BLD-MCNC: 16 MG/DL (ref 7–18)
BUN/CREAT SERPL: 15.7 (ref 10–20)
CALCIUM BLD-MCNC: 9.8 MG/DL (ref 8.5–10.1)
CHLORIDE SERPL-SCNC: 105 MMOL/L (ref 98–112)
CHOLEST SMN-MCNC: 217 MG/DL (ref ?–200)
CO2 SERPL-SCNC: 31 MMOL/L (ref 21–32)
CREAT BLD-MCNC: 1.02 MG/DL
DEPRECATED RDW RBC AUTO: 40.3 FL (ref 35.1–46.3)
EOSINOPHIL # BLD AUTO: 0.19 X10(3) UL (ref 0–0.7)
EOSINOPHIL NFR BLD AUTO: 3.2 %
ERYTHROCYTE [DISTWIDTH] IN BLOOD BY AUTOMATED COUNT: 13 % (ref 11–15)
GLOBULIN PLAS-MCNC: 4.2 G/DL (ref 2.8–4.4)
GLUCOSE BLD-MCNC: 89 MG/DL (ref 70–99)
HCT VFR BLD AUTO: 47.9 %
HDLC SERPL-MCNC: 84 MG/DL (ref 40–59)
HGB BLD-MCNC: 14.8 G/DL
IMM GRANULOCYTES # BLD AUTO: 0.01 X10(3) UL (ref 0–1)
IMM GRANULOCYTES NFR BLD: 0.2 %
LDLC SERPL CALC-MCNC: 123 MG/DL (ref ?–100)
LYMPHOCYTES # BLD AUTO: 1.99 X10(3) UL (ref 1–4)
LYMPHOCYTES NFR BLD AUTO: 33.2 %
M PROTEIN MFR SERPL ELPH: 7.9 G/DL (ref 6.4–8.2)
MCH RBC QN AUTO: 26.5 PG (ref 26–34)
MCHC RBC AUTO-ENTMCNC: 30.9 G/DL (ref 31–37)
MCV RBC AUTO: 85.8 FL
MONOCYTES # BLD AUTO: 0.46 X10(3) UL (ref 0.1–1)
MONOCYTES NFR BLD AUTO: 7.7 %
NEUTROPHILS # BLD AUTO: 3.28 X10 (3) UL (ref 1.5–7.7)
NEUTROPHILS # BLD AUTO: 3.28 X10(3) UL (ref 1.5–7.7)
NEUTROPHILS NFR BLD AUTO: 54.7 %
NONHDLC SERPL-MCNC: 133 MG/DL (ref ?–130)
OSMOLALITY SERPL CALC.SUM OF ELEC: 289 MOSM/KG (ref 275–295)
PATIENT FASTING Y/N/NP: YES
PATIENT FASTING Y/N/NP: YES
PLATELET # BLD AUTO: 241 10(3)UL (ref 150–450)
POTASSIUM SERPL-SCNC: 4.2 MMOL/L (ref 3.5–5.1)
RBC # BLD AUTO: 5.58 X10(6)UL
SODIUM SERPL-SCNC: 139 MMOL/L (ref 136–145)
T4 FREE SERPL-MCNC: 1 NG/DL (ref 0.8–1.7)
TRIGL SERPL-MCNC: 50 MG/DL (ref 30–149)
TSI SER-ACNC: 4.87 MIU/ML (ref 0.36–3.74)
VLDLC SERPL CALC-MCNC: 10 MG/DL (ref 0–30)
WBC # BLD AUTO: 6 X10(3) UL (ref 4–11)

## 2021-01-09 PROCEDURE — 84439 ASSAY OF FREE THYROXINE: CPT

## 2021-01-09 PROCEDURE — 82248 BILIRUBIN DIRECT: CPT

## 2021-01-09 PROCEDURE — 36415 COLL VENOUS BLD VENIPUNCTURE: CPT

## 2021-01-09 PROCEDURE — 80053 COMPREHEN METABOLIC PANEL: CPT

## 2021-01-09 PROCEDURE — 85060 BLOOD SMEAR INTERPRETATION: CPT

## 2021-01-09 PROCEDURE — 80061 LIPID PANEL: CPT

## 2021-01-09 PROCEDURE — 84443 ASSAY THYROID STIM HORMONE: CPT

## 2021-01-09 PROCEDURE — 85025 COMPLETE CBC W/AUTO DIFF WBC: CPT

## 2021-01-11 DIAGNOSIS — R74.8 ELEVATED ALKALINE PHOSPHATASE LEVEL: Primary | ICD-10-CM

## 2021-01-11 DIAGNOSIS — R79.89 ABNORMAL TSH: ICD-10-CM

## 2021-01-12 NOTE — TELEPHONE ENCOUNTER
Nadege Mike    1/12/21 2:14 PM  Note     Pt states upcoming CLN is coded as diagnostic - not routine - pls advise             1/12/21 2:13 PM    Tanya Mauro contacted [facility]

## 2021-01-12 NOTE — TELEPHONE ENCOUNTER
Spoke to pt. Verified . Went over [address] codes for [address] with pt. Explained to pt- D/t HX of colon polyps and family HX of colon cancer, procedure is NOT coded as Routine. Pt verbalized understanding and was just looking for clarification on the codes used.  Marci Grimes

## 2021-01-15 ENCOUNTER — TELEPHONE (OUTPATIENT)
Dept: GASTROENTEROLOGY | Facility: CLINIC | Age: 61
End: 2021-01-15

## 2021-01-15 NOTE — TELEPHONE ENCOUNTER
RN spoke to pt. Verified . Pt spoke with [facility] regarding billing purposes for upcoming procedure. Pt is upset d/t in the past she always received billing inquires after procedures and was not expecting a call prior to procedure.      Per pt states she want

## 2021-01-15 NOTE — TELEPHONE ENCOUNTER
Pt. wants to know why her proc was sched at [facility]? Why is proc not sched at [facility].? Pt. States that her proc is sched on Tuesday 1/19/2021. Pt. Just wants to understand, as she says that she has good insur. ,

## 2021-01-16 ENCOUNTER — LAB REQUISITION (OUTPATIENT)
Dept: LAB | Facility: HOSPITAL | Age: 61
End: 2021-01-16
Payer: COMMERCIAL

## 2021-01-16 DIAGNOSIS — Z01.818 ENCOUNTER FOR OTHER PREPROCEDURAL EXAMINATION: ICD-10-CM

## 2021-01-17 LAB — SARS-COV-2 RNA RESP QL NAA+PROBE: NOT DETECTED

## 2021-01-18 NOTE — TELEPHONE ENCOUNTER
RN spoke to pt. Verified pt's . Went over medications for CLN procedure tomorrow. Verified with pt it is OK to take Hydrochlorothiazide (5MG) and Valsartan d/t pt now going to [facility] for procedure; D/T pt NOT being scheduled at [facility].  Pt also inquiring if it

## 2021-01-18 NOTE — TELEPHONE ENCOUNTER
Vic Clay routed this conversation to  [facility], [address]    1/18/21 9:47 AM  Note     \"pt. wants to know arrival time, and find out if she is still able to take her Blood pressure, aspirin and Levocetirizine meds today?  Pt

## 2021-01-19 ENCOUNTER — LAB REQUISITION (OUTPATIENT)
Dept: LAB | Facility: HOSPITAL | Age: 61
End: 2021-01-19
Payer: COMMERCIAL

## 2021-01-19 ENCOUNTER — SURGERY CENTER DOCUMENTATION (OUTPATIENT)
Dept: SURGERY | Age: 61
End: 2021-01-19

## 2021-01-19 DIAGNOSIS — Z86.010 PERSONAL HISTORY OF COLONIC POLYPS: ICD-10-CM

## 2021-01-19 DIAGNOSIS — Z80.0 FAMILY HISTORY OF MALIGNANT NEOPLASM OF DIGESTIVE ORGANS: ICD-10-CM

## 2021-01-19 PROCEDURE — 88305 TISSUE EXAM BY PATHOLOGIST: CPT | Performed by: INTERNAL MEDICINE

## 2021-01-19 PROCEDURE — 45380 COLONOSCOPY AND BIOPSY: CPT | Performed by: INTERNAL MEDICINE

## 2021-01-19 NOTE — PROCEDURES
COLONOSCOPY REPORT    Aiden Cue     1960 Age 62year old   PCP Asma M. Sharlet Boxer, MD Jenni Britain, MD     Date of procedure: 21    Procedure: Colonoscopy w/ cold biopsy polypectomy    Pre-operative diagnosis: family history of colon hemorrhoids. 5. The colonic mucosa throughout the colon showed normal vascular pattern, without evidence of angioectasias or inflammation. 6. WILNER: normal rectal tone, no masses palpated. Recommend:  · Await pathology.  The interval for the next

## 2021-01-25 ENCOUNTER — PATIENT MESSAGE (OUTPATIENT)
Dept: GASTROENTEROLOGY | Facility: CLINIC | Age: 61
End: 2021-01-25

## 2021-01-25 ENCOUNTER — TELEPHONE (OUTPATIENT)
Dept: PODIATRY CLINIC | Facility: CLINIC | Age: 61
End: 2021-01-25

## 2021-01-25 DIAGNOSIS — R74.8 ELEVATED ALKALINE PHOSPHATASE LEVEL: Primary | ICD-10-CM

## 2021-01-25 NOTE — TELEPHONE ENCOUNTER
Left message to call back for the patient. Dr. Taty Camilo, please see Dr. Jaxson Mccain message below. Would you advise pt continue with Lamisil treatment for toenail fungus or hold at this time? Please respond to [facility] Clinical Staff thank you!

## 2021-01-25 NOTE — TELEPHONE ENCOUNTER
From: Altagracia Noriega  To: Syed Desai MD  Sent: 1/25/2021 2:31 PM CST  Subject: Test Results Question    Hi Dr. Mily Cifuentes,    I have viewed my lab results and realize you may not yet have had the chance to review them yet, however, I look forward to your interp

## 2021-01-25 NOTE — TELEPHONE ENCOUNTER
----- Message from Vesta Goode DPM sent at 1/12/2021  6:56 PM CST -----  Please let the patient know that her alkaline phosphatase went up quite a bit she should get clearance for the next dosing of Lamisil from her medical doctor I do not think it will be a problem and reassure the patient that its its not excessively high just over high normal

## 2021-01-25 NOTE — TELEPHONE ENCOUNTER
Dr. Biswas Court-    Per [address] below pt viewed, I believe, Pathology from 01/19/2021 as those are the most recent results per our office. Please advise on recommendations. Thank you!

## 2021-01-27 ENCOUNTER — TELEPHONE (OUTPATIENT)
Dept: GASTROENTEROLOGY | Facility: CLINIC | Age: 61
End: 2021-01-27

## 2021-01-27 NOTE — TELEPHONE ENCOUNTER
Patient can use ciclopirox 8% solution and use it daily as instructed but we should still see her every 3 months or so.

## 2021-01-27 NOTE — TELEPHONE ENCOUNTER
Spoke to pt and informed her of Dr Eber Mazariegos and Dr Mariam Michaels messages. Pt states she has not ever taken oral Lamisil. Pt would like to know if there are any other topicals she may use for the fungus besides Jublia? Pt will speak with Volodymyr in the meantime regarding her lab results. Dr. Eber Mazariegos please advise.

## 2021-01-27 NOTE — TELEPHONE ENCOUNTER
Entered into [facility]. Recall CLN in 5 years per Dr. Kermit Goldberg. Last CLN done 01/19/2021. Recall entered into Patient Outreach for 01/19/2026. HM updated.

## 2021-01-27 NOTE — TELEPHONE ENCOUNTER
----- Message from Alessia Vargas MD sent at 1/27/2021 11:31 AM CST -----  Replied to [employer] message  GI staff: please place recall for colonoscopy in 5 years

## 2021-01-27 NOTE — TELEPHONE ENCOUNTER
Spoke to pt and informed her of Wilda's message and instructions. Pt wants to discuss any side effects with her pharmacist. Pt will let our office know if she would like to proceed with ciclopirox 8 % solution.

## 2021-03-03 ENCOUNTER — LAB ENCOUNTER (OUTPATIENT)
Dept: LAB | Age: 61
End: 2021-03-03
Attending: FAMILY MEDICINE
Payer: COMMERCIAL

## 2021-03-03 DIAGNOSIS — R74.8 ELEVATED ALKALINE PHOSPHATASE LEVEL: ICD-10-CM

## 2021-03-03 DIAGNOSIS — R79.89 ABNORMAL TSH: ICD-10-CM

## 2021-03-03 LAB
ALBUMIN SERPL-MCNC: 4 G/DL (ref 3.4–5)
ALBUMIN/GLOB SERPL: 1 {RATIO} (ref 1–2)
ALP LIVER SERPL-CCNC: 107 U/L
ALT SERPL-CCNC: 24 U/L
ANION GAP SERPL CALC-SCNC: 4 MMOL/L (ref 0–18)
AST SERPL-CCNC: 20 U/L (ref 15–37)
BILIRUB DIRECT SERPL-MCNC: 0.1 MG/DL (ref 0–0.2)
BILIRUB SERPL-MCNC: 0.5 MG/DL (ref 0.1–2)
BUN BLD-MCNC: 15 MG/DL (ref 7–18)
BUN/CREAT SERPL: 14.7 (ref 10–20)
CALCIUM BLD-MCNC: 10 MG/DL (ref 8.5–10.1)
CHLORIDE SERPL-SCNC: 104 MMOL/L (ref 98–112)
CO2 SERPL-SCNC: 31 MMOL/L (ref 21–32)
CREAT BLD-MCNC: 1.02 MG/DL
GLOBULIN PLAS-MCNC: 4 G/DL (ref 2.8–4.4)
GLUCOSE BLD-MCNC: 101 MG/DL (ref 70–99)
M PROTEIN MFR SERPL ELPH: 8 G/DL (ref 6.4–8.2)
OSMOLALITY SERPL CALC.SUM OF ELEC: 289 MOSM/KG (ref 275–295)
PATIENT FASTING Y/N/NP: NO
POTASSIUM SERPL-SCNC: 3.9 MMOL/L (ref 3.5–5.1)
SODIUM SERPL-SCNC: 139 MMOL/L (ref 136–145)
TSI SER-ACNC: 3.66 MIU/ML (ref 0.36–3.74)

## 2021-03-03 PROCEDURE — 84443 ASSAY THYROID STIM HORMONE: CPT

## 2021-03-03 PROCEDURE — 80053 COMPREHEN METABOLIC PANEL: CPT

## 2021-03-03 PROCEDURE — 36415 COLL VENOUS BLD VENIPUNCTURE: CPT

## 2021-03-03 PROCEDURE — 82248 BILIRUBIN DIRECT: CPT

## 2021-03-20 DIAGNOSIS — Z23 NEED FOR VACCINATION: ICD-10-CM

## 2021-04-15 ENCOUNTER — TELEPHONE (OUTPATIENT)
Dept: FAMILY MEDICINE CLINIC | Facility: CLINIC | Age: 61
End: 2021-04-15

## 2021-04-15 DIAGNOSIS — Z12.31 ENCOUNTER FOR SCREENING MAMMOGRAM FOR MALIGNANT NEOPLASM OF BREAST: Primary | ICD-10-CM

## 2021-04-15 NOTE — TELEPHONE ENCOUNTER
Left message for pt to call back to schedule mammogram and informed eligible for her COVID vaccine as well.

## 2021-05-27 ENCOUNTER — TELEPHONE (OUTPATIENT)
Dept: FAMILY MEDICINE CLINIC | Facility: CLINIC | Age: 61
End: 2021-05-27

## 2021-05-27 DIAGNOSIS — I10 ESSENTIAL HYPERTENSION: ICD-10-CM

## 2021-05-28 DIAGNOSIS — I10 ESSENTIAL HYPERTENSION: ICD-10-CM

## 2021-05-28 NOTE — TELEPHONE ENCOUNTER
Pt said pharmacy was suppose to put in request for both her valsartan and hydrochlorothiazide 25 MG Oral Tab. Only see request for Valsartan, pt requesting refill on hydrochlorothiazide 25 MG Oral Tab as well.

## 2021-05-30 RX ORDER — VALSARTAN 320 MG/1
320 TABLET ORAL
Qty: 90 TABLET | Refills: 3 | Status: SHIPPED | OUTPATIENT
Start: 2021-05-30 | End: 2021-06-23

## 2021-05-30 RX ORDER — HYDROCHLOROTHIAZIDE 25 MG/1
25 TABLET ORAL DAILY
Qty: 90 TABLET | Refills: 3 | Status: SHIPPED | OUTPATIENT
Start: 2021-05-30 | End: 2021-08-11

## 2021-06-04 NOTE — TELEPHONE ENCOUNTER
Pharmacy: Pt needs another refill for 90 days. •  VALSARTAN 320 MG Oral Tab, TAKE 1 TABLET (320 MG TOTAL) BY MOUTH ONCE DAILY. , Disp: 90 tablet, Rfl: 3

## 2021-06-23 DIAGNOSIS — I10 ESSENTIAL HYPERTENSION: ICD-10-CM

## 2021-06-23 RX ORDER — VALSARTAN 320 MG/1
320 TABLET ORAL
Qty: 90 TABLET | Refills: 0 | Status: SHIPPED | OUTPATIENT
Start: 2021-06-23 | End: 2021-08-11

## 2021-07-26 ENCOUNTER — TELEPHONE (OUTPATIENT)
Dept: OBGYN CLINIC | Facility: CLINIC | Age: 61
End: 2021-07-26

## 2021-07-26 DIAGNOSIS — N95.0 PMB (POSTMENOPAUSAL BLEEDING): Primary | ICD-10-CM

## 2021-07-26 NOTE — TELEPHONE ENCOUNTER
Pt c/o PMB. States yesterday she had a tiny bit of red spotting with wiping. Nothing today. Denies pain. Pt states this MAYBE happened 2-3 years ago. Pt is a bit worried. Informed her I will route to CAP for review.  Pt states if she needs to be seen she is

## 2021-07-27 NOTE — TELEPHONE ENCOUNTER
Yes please give pt a follow up appt with me for EMB at the end of schedule at [place] at 4pm and inform her that biopsy may be done at that appt so that she can take tylenol or motrin.   Please order a transvag US to check her endometrial stripe in the mean

## 2021-07-28 NOTE — TELEPHONE ENCOUNTER
Pt called and notified of CAP recs. Pt verbalizes understanding. Pt instructed on Tylenol or Motrin before appt on 8/31 which is first available at [facility] at 4pm. Pt states understanding. Provided with number to CS for ultrasound.

## 2021-08-11 ENCOUNTER — OFFICE VISIT (OUTPATIENT)
Dept: FAMILY MEDICINE CLINIC | Facility: CLINIC | Age: 61
End: 2021-08-11
Payer: COMMERCIAL

## 2021-08-11 VITALS
RESPIRATION RATE: 16 BRPM | HEART RATE: 94 BPM | WEIGHT: 235 LBS | DIASTOLIC BLOOD PRESSURE: 84 MMHG | TEMPERATURE: 97 F | SYSTOLIC BLOOD PRESSURE: 129 MMHG | HEIGHT: 67.4 IN | BODY MASS INDEX: 36.45 KG/M2

## 2021-08-11 DIAGNOSIS — N39.41 URGE INCONTINENCE OF URINE: ICD-10-CM

## 2021-08-11 DIAGNOSIS — I10 ESSENTIAL HYPERTENSION: Primary | ICD-10-CM

## 2021-08-11 DIAGNOSIS — E78.00 HYPERCHOLESTEREMIA: ICD-10-CM

## 2021-08-11 DIAGNOSIS — R94.6 ABNORMAL THYROID FUNCTION TEST: ICD-10-CM

## 2021-08-11 DIAGNOSIS — E66.9 OBESITY (BMI 30-39.9): ICD-10-CM

## 2021-08-11 PROCEDURE — 3008F BODY MASS INDEX DOCD: CPT | Performed by: FAMILY MEDICINE

## 2021-08-11 PROCEDURE — 3079F DIAST BP 80-89 MM HG: CPT | Performed by: FAMILY MEDICINE

## 2021-08-11 PROCEDURE — 99214 OFFICE O/P EST MOD 30 MIN: CPT | Performed by: FAMILY MEDICINE

## 2021-08-11 PROCEDURE — 3074F SYST BP LT 130 MM HG: CPT | Performed by: FAMILY MEDICINE

## 2021-08-11 RX ORDER — ACETAMINOPHEN 160 MG
2000 TABLET,DISINTEGRATING ORAL DAILY
COMMUNITY

## 2021-08-11 RX ORDER — HYDROCHLOROTHIAZIDE 25 MG/1
25 TABLET ORAL DAILY
Qty: 90 TABLET | Refills: 3 | Status: SHIPPED | OUTPATIENT
Start: 2021-08-11

## 2021-08-11 RX ORDER — VALSARTAN 320 MG/1
320 TABLET ORAL
Qty: 90 TABLET | Refills: 3 | Status: SHIPPED | OUTPATIENT
Start: 2021-08-11

## 2021-08-11 NOTE — PATIENT INSTRUCTIONS
Kegel Exercises  Kegel exercises are done to help strengthen the muscles in your pelvic floor. You don’t need special clothing or equipment. They’re easy to learn and simple to do.  And if you do them right, no one can tell you’re doing them, so they can sit down. · Tighten your pelvic floor before you sneeze, get up from a chair, cough, laugh, or lift. This can help prevent urine, gas, or stool leakage. Jennifer last reviewed this educational content on 8/1/2020  © 5361-2928 [employer].  A

## 2021-08-11 NOTE — PROGRESS NOTES
HPI:    Patient ID: Angel Bailey is a 62year old female.     HPI  Patient presents with:  HTN  Medication Request      Wt Readings from Last 6 Encounters:  08/11/21 : 235 lb (106.6 kg)  12/17/20 : 240 lb (108.9 kg)  12/07/20 : 243 lb (110.2 kg)  12/03/20 Rate and Rhythm: Normal rate and regular rhythm. Heart sounds: No murmur heard. Pulmonary:      Effort: Pulmonary effort is normal.      Breath sounds: Normal breath sounds. No wheezing.    Musculoskeletal:      Cervical back: Normal range of motio

## 2021-08-16 ENCOUNTER — LAB ENCOUNTER (OUTPATIENT)
Dept: LAB | Age: 61
End: 2021-08-16
Attending: FAMILY MEDICINE
Payer: COMMERCIAL

## 2021-08-16 DIAGNOSIS — E66.9 OBESITY (BMI 30-39.9): ICD-10-CM

## 2021-08-16 DIAGNOSIS — R94.6 ABNORMAL THYROID FUNCTION TEST: ICD-10-CM

## 2021-08-16 LAB
TSI SER-ACNC: 3.06 MIU/ML (ref 0.36–3.74)
VIT B12 SERPL-MCNC: 507 PG/ML (ref 193–986)

## 2021-08-16 PROCEDURE — 82607 VITAMIN B-12: CPT

## 2021-08-16 PROCEDURE — 36415 COLL VENOUS BLD VENIPUNCTURE: CPT

## 2021-08-16 PROCEDURE — 84443 ASSAY THYROID STIM HORMONE: CPT

## 2021-08-25 ENCOUNTER — HOSPITAL ENCOUNTER (OUTPATIENT)
Dept: ULTRASOUND IMAGING | Facility: HOSPITAL | Age: 61
Discharge: HOME | End: 2021-08-25
Attending: OBSTETRICS & GYNECOLOGY
Payer: COMMERCIAL

## 2021-08-25 DIAGNOSIS — N95.0 PMB (POSTMENOPAUSAL BLEEDING): ICD-10-CM

## 2021-08-25 PROCEDURE — 76830 TRANSVAGINAL US NON-OB: CPT | Performed by: OBSTETRICS & GYNECOLOGY

## 2021-08-25 PROCEDURE — 76856 US EXAM PELVIC COMPLETE: CPT | Performed by: OBSTETRICS & GYNECOLOGY

## 2021-08-26 ENCOUNTER — TELEPHONE (OUTPATIENT)
Dept: OBGYN CLINIC | Facility: CLINIC | Age: 61
End: 2021-08-26

## 2021-08-26 NOTE — TELEPHONE ENCOUNTER
Pt requesting pelvis US results. States she received the report in [employer]. Informed pt CAP has not had a chance to review the report yet and message will be sent to CAP to ask to please review and advise.  Pt is worried about report stating \"Endometrial c

## 2021-08-27 NOTE — TELEPHONE ENCOUNTER
Please reassure pt that anytime that there is thickened endometrium that CA is always on the differential diagnosis but only 1% of biopsies have this diagnosis. The reason that we do the biopsy is to rule out endometrial cancer.   Her lining is only slight

## 2021-08-27 NOTE — TELEPHONE ENCOUNTER
Pt notified of CAP's message below. Pt is tearful and emotional about the good news. Encouraged pt to call back with any questions or concerns. Pt is appreciative and plans on coming in for embx next week.

## 2021-08-31 ENCOUNTER — OFFICE VISIT (OUTPATIENT)
Dept: OBGYN CLINIC | Facility: CLINIC | Age: 61
End: 2021-08-31
Payer: COMMERCIAL

## 2021-08-31 VITALS
DIASTOLIC BLOOD PRESSURE: 96 MMHG | WEIGHT: 238 LBS | BODY MASS INDEX: 37 KG/M2 | HEART RATE: 89 BPM | SYSTOLIC BLOOD PRESSURE: 161 MMHG

## 2021-08-31 DIAGNOSIS — R93.89 THICKENED ENDOMETRIUM: Primary | ICD-10-CM

## 2021-08-31 PROCEDURE — 3077F SYST BP >= 140 MM HG: CPT | Performed by: OBSTETRICS & GYNECOLOGY

## 2021-08-31 PROCEDURE — 58100 BIOPSY OF UTERUS LINING: CPT | Performed by: OBSTETRICS & GYNECOLOGY

## 2021-08-31 PROCEDURE — 3080F DIAST BP >= 90 MM HG: CPT | Performed by: OBSTETRICS & GYNECOLOGY

## 2021-09-02 ENCOUNTER — TELEPHONE (OUTPATIENT)
Dept: FAMILY MEDICINE CLINIC | Facility: CLINIC | Age: 61
End: 2021-09-02

## 2021-09-02 NOTE — TELEPHONE ENCOUNTER
Patient has started taking the nature made multi for her;  As advised at last visit. This contains Vit D3 25mcg daily. Patient is asking if she should continue her D3 at 50mcg daily as well.     She is concerned about taking too much Vit D.

## 2021-09-02 NOTE — TELEPHONE ENCOUNTER
Pt informed of message below she would also like to know if is ok if has biotin included in it which contains 30mcg she stated she will send you a list of her vitamins so you could double check.

## 2021-10-04 ENCOUNTER — HOSPITAL ENCOUNTER (OUTPATIENT)
Dept: MAMMOGRAPHY | Age: 61
Discharge: HOME | End: 2021-10-04
Attending: FAMILY MEDICINE
Payer: COMMERCIAL

## 2021-10-04 DIAGNOSIS — Z12.31 ENCOUNTER FOR SCREENING MAMMOGRAM FOR MALIGNANT NEOPLASM OF BREAST: ICD-10-CM

## 2021-10-04 PROCEDURE — 77067 SCR MAMMO BI INCL CAD: CPT | Performed by: FAMILY MEDICINE

## 2021-10-04 PROCEDURE — 77063 BREAST TOMOSYNTHESIS BI: CPT | Performed by: FAMILY MEDICINE

## 2021-11-10 ENCOUNTER — TELEPHONE (OUTPATIENT)
Dept: FAMILY MEDICINE CLINIC | Facility: CLINIC | Age: 61
End: 2021-11-10

## 2021-11-10 NOTE — TELEPHONE ENCOUNTER
Spoke with pt and advised per CDC no longer need wait in between flu/covid booster. Pt verb understanding.

## 2021-11-24 ENCOUNTER — IMMUNIZATION (OUTPATIENT)
Dept: LAB | Facility: HOSPITAL | Age: 61
End: 2021-11-24
Attending: EMERGENCY MEDICINE
Payer: COMMERCIAL

## 2021-11-24 DIAGNOSIS — Z23 NEED FOR VACCINATION: Primary | ICD-10-CM

## 2021-11-24 PROCEDURE — 0064A SARSCOV2 VAC 50MCG/0.25ML IM: CPT | Performed by: NURSE PRACTITIONER

## 2021-12-20 RX ORDER — LEVOCETIRIZINE DIHYDROCHLORIDE 5 MG/1
5 TABLET, FILM COATED ORAL DAILY
Qty: 90 TABLET | Refills: 1 | Status: SHIPPED | OUTPATIENT
Start: 2021-12-20 | End: 2022-07-11

## 2022-01-01 NOTE — TELEPHONE ENCOUNTER
Refill passed per [place] [facility] protocol.   Requested Prescriptions   Pending Prescriptions Disp Refills    LEVOCETIRIZINE 5 MG Oral Tab [Pharmacy Med Name: LEVOCETIRIZINE 5 MG TABLET] 90 tablet 3     Sig: TAKE 1 TABLET BY MOUTH EVERY DAY        Allergy Medication Protocol Passed - 12/20/2021 12:01 AM        Passed - Appoinment in past 12 or next 3 months               Recent Outpatient Visits              3 months ago Thickened endometrium    [facility] Alvine, [place] Jourdain, MD    Office Visit    4 months ago Essential hypertension    Curry Morris MD    Office Visit    1 year ago Encounter for gynecological examination without abnormal finding    [facility] Momo Real [facility]    Office Visit    1 year ago Encounter for screening colonoscopy    Andres Marr MD    Office Visit    1 year ago Lung nodule    Patrick, [address] [place] [address], [place], [place]    Office Visit
(1) body pink, extremities blue

## 2022-07-11 RX ORDER — LEVOCETIRIZINE DIHYDROCHLORIDE 5 MG/1
5 TABLET, FILM COATED ORAL DAILY
Qty: 90 TABLET | Refills: 1 | Status: SHIPPED | OUTPATIENT
Start: 2022-07-11

## 2022-10-04 ENCOUNTER — ORDER TRANSCRIPTION (OUTPATIENT)
Dept: ADMINISTRATIVE | Facility: HOSPITAL | Age: 62
End: 2022-10-04

## 2022-10-04 ENCOUNTER — TELEPHONE (OUTPATIENT)
Dept: FAMILY MEDICINE CLINIC | Facility: CLINIC | Age: 62
End: 2022-10-04

## 2022-10-04 DIAGNOSIS — E55.9 VITAMIN D DEFICIENCY: ICD-10-CM

## 2022-10-04 DIAGNOSIS — Z00.00 WELL ADULT EXAM: Primary | ICD-10-CM

## 2022-10-04 DIAGNOSIS — R73.03 PREDIABETES: ICD-10-CM

## 2022-10-04 DIAGNOSIS — Z12.31 ENCOUNTER FOR SCREENING MAMMOGRAM FOR MALIGNANT NEOPLASM OF BREAST: Primary | ICD-10-CM

## 2022-10-04 DIAGNOSIS — Z78.0 POSTMENOPAUSAL: ICD-10-CM

## 2022-10-04 NOTE — TELEPHONE ENCOUNTER
Please inform patient fasting blood work has been ordered.   Bone density typically ordered at the age of 73 unless she had premature menopause or excessive use of steroids over the years  I can discuss further at her visit

## 2022-10-04 NOTE — TELEPHONE ENCOUNTER
Can you please order labs for upcoming appointment.     Dexa has been pended for approval if appropriate

## 2022-10-04 NOTE — TELEPHONE ENCOUNTER
Pt advised of message below pt stated she was told she had bone loss on her teeth so that's why she was wondering about bone density.  Pt scheduled for physical October 24

## 2022-10-05 ENCOUNTER — IMMUNIZATION (OUTPATIENT)
Dept: LAB | Age: 62
End: 2022-10-05
Attending: EMERGENCY MEDICINE
Payer: COMMERCIAL

## 2022-10-05 DIAGNOSIS — Z23 NEED FOR VACCINATION: Primary | ICD-10-CM

## 2022-10-05 PROCEDURE — 0134A SARSCOV2 VAC BVL 50MCG/0.5ML: CPT

## 2022-10-26 ENCOUNTER — HOSPITAL ENCOUNTER (OUTPATIENT)
Dept: MAMMOGRAPHY | Age: 62
Discharge: HOME | End: 2022-10-26
Attending: FAMILY MEDICINE
Payer: COMMERCIAL

## 2022-10-26 DIAGNOSIS — Z12.31 ENCOUNTER FOR SCREENING MAMMOGRAM FOR MALIGNANT NEOPLASM OF BREAST: ICD-10-CM

## 2022-10-26 PROCEDURE — 77063 BREAST TOMOSYNTHESIS BI: CPT | Performed by: FAMILY MEDICINE

## 2022-10-26 PROCEDURE — 77067 SCR MAMMO BI INCL CAD: CPT | Performed by: FAMILY MEDICINE

## 2022-11-07 ENCOUNTER — OFFICE VISIT (OUTPATIENT)
Dept: FAMILY MEDICINE CLINIC | Facility: CLINIC | Age: 62
End: 2022-11-07
Payer: COMMERCIAL

## 2022-11-07 VITALS
SYSTOLIC BLOOD PRESSURE: 136 MMHG | DIASTOLIC BLOOD PRESSURE: 84 MMHG | HEIGHT: 67.4 IN | HEART RATE: 102 BPM | WEIGHT: 237 LBS | BODY MASS INDEX: 36.76 KG/M2

## 2022-11-07 DIAGNOSIS — Z00.00 WELL ADULT EXAM: Primary | ICD-10-CM

## 2022-11-07 DIAGNOSIS — F32.A DEPRESSION, UNSPECIFIED DEPRESSION TYPE: ICD-10-CM

## 2022-11-07 DIAGNOSIS — I10 ESSENTIAL HYPERTENSION: ICD-10-CM

## 2022-11-07 DIAGNOSIS — N39.41 URGE INCONTINENCE OF URINE: ICD-10-CM

## 2022-11-07 DIAGNOSIS — Z23 NEED FOR SHINGLES VACCINE: ICD-10-CM

## 2022-11-07 DIAGNOSIS — F43.81 PROLONGED GRIEF REACTION: ICD-10-CM

## 2022-11-07 DIAGNOSIS — R73.03 PREDIABETES: ICD-10-CM

## 2022-11-07 DIAGNOSIS — E66.9 OBESITY (BMI 30-39.9): ICD-10-CM

## 2022-11-07 DIAGNOSIS — E55.9 VITAMIN D DEFICIENCY: ICD-10-CM

## 2022-11-07 DIAGNOSIS — Z23 NEED FOR PNEUMOCOCCAL VACCINATION: ICD-10-CM

## 2022-11-07 DIAGNOSIS — R91.1 LEFT LOWER LOBE PULMONARY NODULE: ICD-10-CM

## 2022-11-07 DIAGNOSIS — E78.00 HYPERCHOLESTEREMIA: ICD-10-CM

## 2022-11-07 DIAGNOSIS — R93.1 ELEVATED CORONARY ARTERY CALCIUM SCORE: ICD-10-CM

## 2022-11-07 DIAGNOSIS — K44.9 HIATAL HERNIA: ICD-10-CM

## 2022-11-07 DIAGNOSIS — Z80.0 FH: COLON CANCER: ICD-10-CM

## 2022-11-07 DIAGNOSIS — Z82.49 FH: CAD (CORONARY ARTERY DISEASE): ICD-10-CM

## 2022-11-07 PROBLEM — F43.29 PROLONGED GRIEF REACTION: Status: ACTIVE | Noted: 2022-11-07

## 2022-11-07 RX ORDER — VALSARTAN 320 MG/1
320 TABLET ORAL
Qty: 90 TABLET | Refills: 3 | Status: SHIPPED | OUTPATIENT
Start: 2022-11-07

## 2022-11-07 RX ORDER — HYDROCHLOROTHIAZIDE 25 MG/1
25 TABLET ORAL DAILY
Qty: 90 TABLET | Refills: 3 | Status: SHIPPED | OUTPATIENT
Start: 2022-11-07

## 2022-11-08 ENCOUNTER — PATIENT MESSAGE (OUTPATIENT)
Dept: FAMILY MEDICINE CLINIC | Facility: CLINIC | Age: 62
End: 2022-11-08

## 2022-11-08 NOTE — TELEPHONE ENCOUNTER
From: Aldo Puente  To: Flores Valentino MD  Sent: 11/8/2022 10:22 AM CST  Subject: Authorization for Tests    Hi Dr. John Valentino,  Before attempting to schedule the DEXA and CT Chest X-Ray tests, I called my insurance company to ask whether either of them needed pre-authorization and was told that your office would need to contact [employer] to obtain pre-authorization on my behalf. I am particularly eager to get the chest x-ray done and want to utilize my insurance efficiently. Thank you.   Sincerely,   Monique Lokesh

## 2022-11-09 NOTE — TELEPHONE ENCOUNTER
Hello     The only thing that needs pre authorization is CT of the chest.     I will work on this and send my chart message when authorized.     Thank you,  [facility]  Referral specialist

## 2022-11-11 ENCOUNTER — TELEPHONE (OUTPATIENT)
Dept: CASE MANAGEMENT | Age: 62
End: 2022-11-11

## 2022-11-11 ENCOUNTER — LAB ENCOUNTER (OUTPATIENT)
Dept: LAB | Age: 62
End: 2022-11-11
Attending: FAMILY MEDICINE
Payer: COMMERCIAL

## 2022-11-11 DIAGNOSIS — Z00.00 WELL ADULT EXAM: ICD-10-CM

## 2022-11-11 DIAGNOSIS — E55.9 VITAMIN D DEFICIENCY: ICD-10-CM

## 2022-11-11 DIAGNOSIS — R73.03 PREDIABETES: ICD-10-CM

## 2022-11-11 LAB
ALBUMIN SERPL-MCNC: 3.7 G/DL (ref 3.4–5)
ALBUMIN/GLOB SERPL: 1.1 {RATIO} (ref 1–2)
ALP LIVER SERPL-CCNC: 111 U/L
ALT SERPL-CCNC: 23 U/L
ANION GAP SERPL CALC-SCNC: 6 MMOL/L (ref 0–18)
AST SERPL-CCNC: 19 U/L (ref 15–37)
BASOPHILS # BLD AUTO: 0.03 X10(3) UL (ref 0–0.2)
BASOPHILS NFR BLD AUTO: 0.6 %
BILIRUB SERPL-MCNC: 0.5 MG/DL (ref 0.1–2)
BUN BLD-MCNC: 15 MG/DL (ref 7–18)
BUN/CREAT SERPL: 17.6 (ref 10–20)
CALCIUM BLD-MCNC: 9.4 MG/DL (ref 8.5–10.1)
CHLORIDE SERPL-SCNC: 103 MMOL/L (ref 98–112)
CHOLEST SERPL-MCNC: 240 MG/DL (ref ?–200)
CO2 SERPL-SCNC: 29 MMOL/L (ref 21–32)
CREAT BLD-MCNC: 0.85 MG/DL
DEPRECATED RDW RBC AUTO: 41.3 FL (ref 35.1–46.3)
EOSINOPHIL # BLD AUTO: 0.12 X10(3) UL (ref 0–0.7)
EOSINOPHIL NFR BLD AUTO: 2.5 %
ERYTHROCYTE [DISTWIDTH] IN BLOOD BY AUTOMATED COUNT: 12.9 % (ref 11–15)
EST. AVERAGE GLUCOSE BLD GHB EST-MCNC: 128 MG/DL (ref 68–126)
FASTING PATIENT LIPID ANSWER: YES
FASTING STATUS PATIENT QL REPORTED: YES
GFR SERPLBLD BASED ON 1.73 SQ M-ARVRAT: 78 ML/MIN/1.73M2 (ref 60–?)
GLOBULIN PLAS-MCNC: 3.4 G/DL (ref 2.8–4.4)
GLUCOSE BLD-MCNC: 83 MG/DL (ref 70–99)
HBA1C MFR BLD: 6.1 % (ref ?–5.7)
HCT VFR BLD AUTO: 49 %
HDLC SERPL-MCNC: 79 MG/DL (ref 40–59)
HGB BLD-MCNC: 14.7 G/DL
IMM GRANULOCYTES # BLD AUTO: 0.01 X10(3) UL (ref 0–1)
IMM GRANULOCYTES NFR BLD: 0.2 %
LDLC SERPL CALC-MCNC: 147 MG/DL (ref ?–100)
LYMPHOCYTES # BLD AUTO: 1.62 X10(3) UL (ref 1–4)
LYMPHOCYTES NFR BLD AUTO: 33.3 %
MCH RBC QN AUTO: 26.2 PG (ref 26–34)
MCHC RBC AUTO-ENTMCNC: 30 G/DL (ref 31–37)
MCV RBC AUTO: 87.2 FL
MONOCYTES # BLD AUTO: 0.41 X10(3) UL (ref 0.1–1)
MONOCYTES NFR BLD AUTO: 8.4 %
NEUTROPHILS # BLD AUTO: 2.68 X10 (3) UL (ref 1.5–7.7)
NEUTROPHILS # BLD AUTO: 2.68 X10(3) UL (ref 1.5–7.7)
NEUTROPHILS NFR BLD AUTO: 55 %
NONHDLC SERPL-MCNC: 161 MG/DL (ref ?–130)
OSMOLALITY SERPL CALC.SUM OF ELEC: 286 MOSM/KG (ref 275–295)
PLATELET # BLD AUTO: 234 10(3)UL (ref 150–450)
POTASSIUM SERPL-SCNC: 4.4 MMOL/L (ref 3.5–5.1)
PROT SERPL-MCNC: 7.1 G/DL (ref 6.4–8.2)
RBC # BLD AUTO: 5.62 X10(6)UL
SODIUM SERPL-SCNC: 138 MMOL/L (ref 136–145)
T4 FREE SERPL-MCNC: 0.9 NG/DL (ref 0.8–1.7)
TRIGL SERPL-MCNC: 83 MG/DL (ref 30–149)
TSI SER-ACNC: 3.92 MIU/ML (ref 0.36–3.74)
VIT D+METAB SERPL-MCNC: 34.1 NG/ML (ref 30–100)
VLDLC SERPL CALC-MCNC: 16 MG/DL (ref 0–30)
WBC # BLD AUTO: 4.9 X10(3) UL (ref 4–11)

## 2022-11-11 PROCEDURE — 84439 ASSAY OF FREE THYROXINE: CPT

## 2022-11-11 PROCEDURE — 83036 HEMOGLOBIN GLYCOSYLATED A1C: CPT

## 2022-11-11 PROCEDURE — 85060 BLOOD SMEAR INTERPRETATION: CPT

## 2022-11-11 PROCEDURE — 80061 LIPID PANEL: CPT

## 2022-11-11 PROCEDURE — 82306 VITAMIN D 25 HYDROXY: CPT

## 2022-11-11 PROCEDURE — 80053 COMPREHEN METABOLIC PANEL: CPT

## 2022-11-11 PROCEDURE — 84443 ASSAY THYROID STIM HORMONE: CPT

## 2022-11-11 PROCEDURE — 36415 COLL VENOUS BLD VENIPUNCTURE: CPT

## 2022-11-11 PROCEDURE — 85025 COMPLETE CBC W/AUTO DIFF WBC: CPT

## 2022-11-12 ENCOUNTER — HOSPITAL ENCOUNTER (OUTPATIENT)
Dept: CT IMAGING | Age: 62
Discharge: HOME | End: 2022-11-12
Attending: FAMILY MEDICINE
Payer: COMMERCIAL

## 2022-11-12 DIAGNOSIS — R91.1 LEFT LOWER LOBE PULMONARY NODULE: ICD-10-CM

## 2022-11-12 PROCEDURE — 71260 CT THORAX DX C+: CPT | Performed by: FAMILY MEDICINE

## 2022-11-14 ENCOUNTER — IMMUNIZATION (OUTPATIENT)
Dept: FAMILY MEDICINE CLINIC | Facility: CLINIC | Age: 62
End: 2022-11-14
Payer: COMMERCIAL

## 2022-11-14 DIAGNOSIS — Z23 NEED FOR VACCINATION: Primary | ICD-10-CM

## 2022-11-14 PROCEDURE — 90471 IMMUNIZATION ADMIN: CPT | Performed by: FAMILY MEDICINE

## 2022-11-14 PROCEDURE — 90686 IIV4 VACC NO PRSV 0.5 ML IM: CPT | Performed by: FAMILY MEDICINE

## 2022-11-15 NOTE — TELEPHONE ENCOUNTER
Called for peer to peer  CT authorized   Valid 11/9 -1/7/2023    Auth # [id number]  [facility]

## 2022-11-17 DIAGNOSIS — R91.1 RIGHT LOWER LOBE PULMONARY NODULE: Primary | ICD-10-CM

## 2022-11-17 DIAGNOSIS — Z77.22 HISTORY OF SECOND HAND SMOKE EXPOSURE: ICD-10-CM

## 2022-12-14 ENCOUNTER — HOSPITAL ENCOUNTER (OUTPATIENT)
Dept: BONE DENSITY | Age: 62
Discharge: HOME | End: 2022-12-14
Attending: FAMILY MEDICINE
Payer: COMMERCIAL

## 2022-12-14 DIAGNOSIS — Z78.0 POSTMENOPAUSAL: ICD-10-CM

## 2022-12-14 PROCEDURE — 77080 DXA BONE DENSITY AXIAL: CPT | Performed by: FAMILY MEDICINE

## 2022-12-15 ENCOUNTER — PATIENT MESSAGE (OUTPATIENT)
Dept: FAMILY MEDICINE CLINIC | Facility: CLINIC | Age: 62
End: 2022-12-15

## 2022-12-16 NOTE — TELEPHONE ENCOUNTER
From: Bharat Lindo  To: Donnell Rashid MD  Sent: 12/15/2022 12:38 PM CST  Subject: test results    Hi Dr. Lizeth Francis, let me thank you again for getting the DEXA scan approval matter resolved; I received an updated notice from insurance that it would be covered. I took the test yesterday and understand the general conclusion however, would appreciate your interpretation of the numerical portion. Would you also please summarize the other lab results, too? I know we talked and had discussed the cholesterol but want to know if there are any other results I need to focus on. Thank you. I wish you and your family a joyful holiday season.

## 2023-03-24 RX ORDER — LEVOCETIRIZINE DIHYDROCHLORIDE 5 MG/1
5 TABLET, FILM COATED ORAL DAILY
Qty: 90 TABLET | Refills: 3 | Status: SHIPPED | OUTPATIENT
Start: 2023-03-24

## 2023-03-24 NOTE — TELEPHONE ENCOUNTER
Refill passed per [facility] protocol. Requested Prescriptions   Pending Prescriptions Disp Refills    LEVOCETIRIZINE 5 MG Oral Tab [Pharmacy Med Name: LEVOCETIRIZINE 5 MG TABLET] 90 tablet 1     Sig: TAKE 1 TABLET (5 MG TOTAL) BY MOUTH DAILY.        Allergy Medication Protocol Passed - 3/24/2023 12:08 AM        Passed - In person appointment or virtual visit in the past 12 mos or appointment in next 3 mos     Recent Outpatient Visits              4 months ago Well adult exam    Eagle Ribeiro MD    Office Visit    1 year ago Thickened endometrium    [facility] [address], [place] - OB/GYN David Mccoy MD    Office Visit    1 year ago Essential hypertension    Love Ruelas MD    Office Visit    2 years ago Encounter for gynecological examination without abnormal finding    Oneil Mosquera, [address] [facility] [facility], [address] [facility] David Mccoy MD    Office Visit    2 years ago Encounter for screening colonoscopy    Liam Irvin MD    Office Visit                         Recent Outpatient Visits              4 months ago Well adult exam    Love Ruelas MD    Office Visit    1 year ago Thickened endometrium    [facility] David [facility]cc[facility]    Office Visit    1 year ago Essential hypertension    Love Ruelas MD    Office Visit    2 years ago Encounter for gynecological examination without abnormal finding    Oneil Mosquera, [address] [facility] David Mccoy MD    Office Visit    2 years ago Encounter for screening colonoscopy    Liam Irvin MD    Office Visit

## 2023-04-14 ENCOUNTER — NURSE TRIAGE (OUTPATIENT)
Dept: FAMILY MEDICINE CLINIC | Facility: CLINIC | Age: 63
End: 2023-04-14

## 2023-04-17 ENCOUNTER — HOSPITAL ENCOUNTER (OUTPATIENT)
Dept: GENERAL RADIOLOGY | Facility: HOSPITAL | Age: 63
Discharge: HOME | End: 2023-04-17
Attending: FAMILY MEDICINE
Payer: COMMERCIAL

## 2023-04-17 ENCOUNTER — HOSPITAL ENCOUNTER (OUTPATIENT)
Dept: ULTRASOUND IMAGING | Facility: HOSPITAL | Age: 63
Discharge: HOME | End: 2023-04-17
Attending: FAMILY MEDICINE
Payer: COMMERCIAL

## 2023-04-17 ENCOUNTER — OFFICE VISIT (OUTPATIENT)
Dept: FAMILY MEDICINE CLINIC | Facility: CLINIC | Age: 63
End: 2023-04-17

## 2023-04-17 VITALS
DIASTOLIC BLOOD PRESSURE: 89 MMHG | HEART RATE: 98 BPM | WEIGHT: 235 LBS | SYSTOLIC BLOOD PRESSURE: 137 MMHG | HEIGHT: 67.4 IN | BODY MASS INDEX: 36.45 KG/M2

## 2023-04-17 DIAGNOSIS — M79.604 RIGHT LEG PAIN: ICD-10-CM

## 2023-04-17 DIAGNOSIS — M25.561 ACUTE PAIN OF RIGHT KNEE: Primary | ICD-10-CM

## 2023-04-17 DIAGNOSIS — M25.561 ACUTE PAIN OF RIGHT KNEE: ICD-10-CM

## 2023-04-17 PROCEDURE — 3008F BODY MASS INDEX DOCD: CPT | Performed by: FAMILY MEDICINE

## 2023-04-17 PROCEDURE — 3075F SYST BP GE 130 - 139MM HG: CPT | Performed by: FAMILY MEDICINE

## 2023-04-17 PROCEDURE — 3079F DIAST BP 80-89 MM HG: CPT | Performed by: FAMILY MEDICINE

## 2023-04-17 PROCEDURE — 73562 X-RAY EXAM OF KNEE 3: CPT | Performed by: FAMILY MEDICINE

## 2023-04-17 PROCEDURE — 99214 OFFICE O/P EST MOD 30 MIN: CPT | Performed by: FAMILY MEDICINE

## 2023-04-17 PROCEDURE — 93971 EXTREMITY STUDY: CPT | Performed by: FAMILY MEDICINE

## 2023-04-17 RX ORDER — CALCIUM CARBONATE 300MG(750)
TABLET,CHEWABLE ORAL
COMMUNITY

## 2023-04-19 DIAGNOSIS — M79.604 RIGHT LEG PAIN: ICD-10-CM

## 2023-04-19 DIAGNOSIS — M25.561 ACUTE PAIN OF RIGHT KNEE: Primary | ICD-10-CM

## 2023-04-19 DIAGNOSIS — M17.11 PRIMARY OSTEOARTHRITIS OF RIGHT KNEE: ICD-10-CM

## 2023-05-09 ENCOUNTER — OFFICE VISIT (OUTPATIENT)
Dept: ORTHOPEDICS CLINIC | Facility: CLINIC | Age: 63
End: 2023-05-09

## 2023-05-09 VITALS
WEIGHT: 235 LBS | HEART RATE: 106 BPM | SYSTOLIC BLOOD PRESSURE: 146 MMHG | DIASTOLIC BLOOD PRESSURE: 98 MMHG | HEIGHT: 67.4 IN | BODY MASS INDEX: 36.45 KG/M2

## 2023-05-09 DIAGNOSIS — M17.11 PRIMARY OSTEOARTHRITIS OF RIGHT KNEE: Primary | ICD-10-CM

## 2023-05-09 DIAGNOSIS — M25.561 ACUTE PAIN OF RIGHT KNEE: ICD-10-CM

## 2023-05-09 PROCEDURE — 99244 OFF/OP CNSLTJ NEW/EST MOD 40: CPT | Performed by: ORTHOPAEDIC SURGERY

## 2023-05-09 PROCEDURE — 3008F BODY MASS INDEX DOCD: CPT | Performed by: ORTHOPAEDIC SURGERY

## 2023-05-09 PROCEDURE — 3077F SYST BP >= 140 MM HG: CPT | Performed by: ORTHOPAEDIC SURGERY

## 2023-05-09 PROCEDURE — 3080F DIAST BP >= 90 MM HG: CPT | Performed by: ORTHOPAEDIC SURGERY

## 2023-05-09 PROCEDURE — 20610 DRAIN/INJ JOINT/BURSA W/O US: CPT | Performed by: ORTHOPAEDIC SURGERY

## 2023-05-09 RX ORDER — TRIAMCINOLONE ACETONIDE 40 MG/ML
40 INJECTION, SUSPENSION INTRA-ARTICULAR; INTRAMUSCULAR ONCE
Status: COMPLETED | OUTPATIENT
Start: 2023-05-09 | End: 2023-05-09

## 2023-05-09 RX ADMIN — TRIAMCINOLONE ACETONIDE 40 MG: 40 INJECTION, SUSPENSION INTRA-ARTICULAR; INTRAMUSCULAR at 16:38:00

## 2023-05-09 NOTE — PROGRESS NOTES
Per verbal order from KALYAN Cuello, draw up 5ml of 0.5% Marcaine and 1ml of Kenalog 40 for cortisone injection to right knee. Jami Lucia  Patient provided education handout for cortisone injection.

## 2023-05-20 ENCOUNTER — TELEPHONE (OUTPATIENT)
Dept: OBGYN CLINIC | Facility: CLINIC | Age: 63
End: 2023-05-20

## 2023-05-20 NOTE — TELEPHONE ENCOUNTER
Pt calling to report that she is postmenopausal and started experiencing vaginal spotting yesterday. Pt stated that is started as old blood, dark brown in color. Pt stated today the spotting is more pink. Pt stated that it just with wiping, not enough to wear a panty liner or pad. Pt stated that the spotting is not every time she wipes. Pt denies any abdominal pain or cramping. Pt stated that she did get a cortisone shot in her knee last week so unsure if that has anything to do with. Pt accepted appt with CAP for exam on 5/23.

## 2023-05-23 ENCOUNTER — OFFICE VISIT (OUTPATIENT)
Dept: OBGYN CLINIC | Facility: CLINIC | Age: 63
End: 2023-05-23

## 2023-05-23 VITALS
SYSTOLIC BLOOD PRESSURE: 145 MMHG | WEIGHT: 233 LBS | DIASTOLIC BLOOD PRESSURE: 87 MMHG | HEART RATE: 96 BPM | BODY MASS INDEX: 36 KG/M2

## 2023-05-23 DIAGNOSIS — N95.0 POSTMENOPAUSAL BLEEDING: Primary | ICD-10-CM

## 2023-05-23 PROCEDURE — 3079F DIAST BP 80-89 MM HG: CPT | Performed by: OBSTETRICS & GYNECOLOGY

## 2023-05-23 PROCEDURE — 99213 OFFICE O/P EST LOW 20 MIN: CPT | Performed by: OBSTETRICS & GYNECOLOGY

## 2023-05-23 PROCEDURE — 3077F SYST BP >= 140 MM HG: CPT | Performed by: OBSTETRICS & GYNECOLOGY

## 2023-05-24 ENCOUNTER — TELEPHONE (OUTPATIENT)
Dept: OBGYN CLINIC | Facility: CLINIC | Age: 63
End: 2023-05-24

## 2023-05-24 NOTE — TELEPHONE ENCOUNTER
Pt called and scheduled for [facility] with CAP on 6/7/2023. Pt states CAP addressed Motrin 30 mins prior. Pt states there was no discuss for Cytotec the night before and would prefer not to take it. Explained why Cytotec is rx'd, pt still preferring not to take. Pt does have a hx of prior c/s x2. RN only needs to call pt back if CAP would like Cytotec. To CAP are you ok with pt not taking Cytotec for [facility]? Thank you.

## 2023-05-24 NOTE — TELEPHONE ENCOUNTER
I believe that she had a previous EMB without cytotec. Please confirm. If so then she does not need it this time either.   thanks

## 2023-05-25 NOTE — TELEPHONE ENCOUNTER
Pt stated she did NOT take cytotec the night prior to last EMBX. Pt informed we don't need it this time then either. Pt verbalized understanding.

## 2023-06-07 ENCOUNTER — OFFICE VISIT (OUTPATIENT)
Dept: OBGYN CLINIC | Facility: CLINIC | Age: 63
End: 2023-06-07

## 2023-06-07 VITALS
HEART RATE: 101 BPM | WEIGHT: 235 LBS | BODY MASS INDEX: 36 KG/M2 | DIASTOLIC BLOOD PRESSURE: 93 MMHG | SYSTOLIC BLOOD PRESSURE: 152 MMHG

## 2023-06-07 DIAGNOSIS — N95.0 POSTMENOPAUSAL BLEEDING: Primary | ICD-10-CM

## 2023-06-07 DIAGNOSIS — R93.89 THICKENED ENDOMETRIUM: ICD-10-CM

## 2023-06-07 PROCEDURE — 3080F DIAST BP >= 90 MM HG: CPT | Performed by: OBSTETRICS & GYNECOLOGY

## 2023-06-07 PROCEDURE — 58100 BIOPSY OF UTERUS LINING: CPT | Performed by: OBSTETRICS & GYNECOLOGY

## 2023-06-07 PROCEDURE — 3077F SYST BP >= 140 MM HG: CPT | Performed by: OBSTETRICS & GYNECOLOGY

## 2023-06-07 NOTE — PROCEDURES
Endometrial Biopsy    Pre-Procedure Care:   Consent was obtained. Procedure/risks were explained. Questions were answered. Correct patient was identified. Correct side and site were confirmed. Pregnancy Results: negative from n/a test   Birth control method(s) used:  postmenopausal    Pre-Medications: The patient was premedicated with Ibuprofen . Description of Procedure:  Under satisfactory analgesia, the patient was prepped and draped in the dorsal lithotomy position. A bivalve speculum was placed in the vagina and the cervix was prepped with Betadine solution. Single tooth tenaculum placed at the 12  o'clock position. The endometrial cavity was curetted for pipelle tissue sampling, 1   passes. Specimen was sent to pathology. The single tooth tenaculum was removed. Silver nitrate was applied at the site of tenaculum application   Good hemostasis was noted. There were no complications. There was no blood loss. Discharge instructions were provided to the patient. Visit Plan:  Await final pathology prior to treatment.

## 2023-06-13 ENCOUNTER — PATIENT MESSAGE (OUTPATIENT)
Dept: OBGYN CLINIC | Facility: CLINIC | Age: 63
End: 2023-06-13

## 2023-06-13 NOTE — TELEPHONE ENCOUNTER
From: Lucina Lai  To: Jam Cruz MD  Sent: 6/13/2023 3:18 PM CDT  Subject: Biopsy Test Result    Dear Dr. Taya Summers,  I am so relieved by the test results! I had a little light bleeding last Friday, but nothing I was too terribly alarmed about as I took things very easy the first 2 days after the procedure and then on Friday June 9, so I figured maybe that was why? I have not seen much more since Friday June 9th, but I choose to wear a light pad, just in case. Unless you say otherwise, I still intend to do the ultrasound that is scheduled for next week. If this situation continues or goes away and returns, what would be my options? Is this related in any way to me being overweight, too? In the meantime, I am grateful for a negative result!      Sincerely,  Mrs. Joyce Francot

## 2023-06-13 NOTE — TELEPHONE ENCOUNTER
From: Alondra Hernadez  To: Jenae Cruz MD  Sent: 6/13/2023 3:20 PM CDT  Subject: biopsy result con't    what I meant to say was that I took things very easy after the biopsy and was back in motion on June 9th running errands and all, and noticed a little light bleeding.

## 2023-07-11 ENCOUNTER — HOSPITAL ENCOUNTER (OUTPATIENT)
Dept: ULTRASOUND IMAGING | Age: 63
Discharge: HOME | End: 2023-07-11
Attending: OBSTETRICS & GYNECOLOGY
Payer: COMMERCIAL

## 2023-07-11 DIAGNOSIS — N95.0 POSTMENOPAUSAL BLEEDING: ICD-10-CM

## 2023-07-11 PROCEDURE — 76856 US EXAM PELVIC COMPLETE: CPT | Performed by: OBSTETRICS & GYNECOLOGY

## 2023-07-11 PROCEDURE — 76830 TRANSVAGINAL US NON-OB: CPT | Performed by: OBSTETRICS & GYNECOLOGY

## 2023-08-04 ENCOUNTER — PATIENT OUTREACH (OUTPATIENT)
Dept: FAMILY MEDICINE CLINIC | Facility: CLINIC | Age: 63
End: 2023-08-04

## 2023-10-09 ENCOUNTER — TELEPHONE (OUTPATIENT)
Dept: FAMILY MEDICINE CLINIC | Facility: CLINIC | Age: 63
End: 2023-10-09

## 2023-10-09 DIAGNOSIS — R73.03 PREDIABETES: ICD-10-CM

## 2023-10-09 DIAGNOSIS — Z12.31 ENCOUNTER FOR SCREENING MAMMOGRAM FOR MALIGNANT NEOPLASM OF BREAST: Primary | ICD-10-CM

## 2023-10-09 DIAGNOSIS — Z00.00 WELL ADULT EXAM: ICD-10-CM

## 2023-10-09 DIAGNOSIS — E55.9 VITAMIN D DEFICIENCY: ICD-10-CM

## 2023-10-09 NOTE — TELEPHONE ENCOUNTER
Per patient she needs Mammogram Order and a complete blood work due to patient has an appointment for her physical on 11/13/2023. Please advise.

## 2023-10-09 NOTE — TELEPHONE ENCOUNTER
LVM to inform patient labs were ordered by Dr. Percy Khan. Patient can go to any of outpatient lab locations.

## 2023-10-09 NOTE — TELEPHONE ENCOUNTER
Please advise on appropriate lab work orders    10/2022 , order placed  RECOMMENDATIONS:   ROUTINE MAMMOGRAM AND CLINICAL EVALUATION IN 12 MONTHS.

## 2023-11-01 ENCOUNTER — PATIENT MESSAGE (OUTPATIENT)
Dept: FAMILY MEDICINE CLINIC | Facility: CLINIC | Age: 63
End: 2023-11-01

## 2023-11-03 NOTE — TELEPHONE ENCOUNTER
From: Ryan Martinez  To: Trisha Beverlyub  Sent: 11/1/2023 11:10 AM CDT  Subject: order for lab work    I have an appointment scheduled with Dr. John Hernandez for an annual physical this month and I would like to complete any lab work before that appointment. I think I requested on October 9, but have not yet heard back. If you prefer me to complete the labs after my appointment, please let me know. Thank you for the mammogram order; it had to be scheduled for December.   Jamilah Patricia

## 2023-11-08 ENCOUNTER — LAB ENCOUNTER (OUTPATIENT)
Dept: LAB | Age: 63
End: 2023-11-08
Attending: FAMILY MEDICINE
Payer: COMMERCIAL

## 2023-11-08 DIAGNOSIS — Z00.00 WELL ADULT EXAM: ICD-10-CM

## 2023-11-08 DIAGNOSIS — E55.9 VITAMIN D DEFICIENCY: ICD-10-CM

## 2023-11-08 DIAGNOSIS — R73.03 PREDIABETES: ICD-10-CM

## 2023-11-08 LAB
ALBUMIN SERPL-MCNC: 4.6 G/DL (ref 3.2–4.8)
ALBUMIN/GLOB SERPL: 1.5 {RATIO} (ref 1–2)
ALP LIVER SERPL-CCNC: 114 U/L
ALT SERPL-CCNC: 23 U/L
ANION GAP SERPL CALC-SCNC: 3 MMOL/L (ref 0–18)
AST SERPL-CCNC: 30 U/L (ref ?–34)
BASOPHILS # BLD AUTO: 0.06 X10(3) UL (ref 0–0.2)
BASOPHILS NFR BLD AUTO: 1 %
BILIRUB SERPL-MCNC: 0.7 MG/DL (ref 0.2–1.1)
BUN BLD-MCNC: 13 MG/DL (ref 9–23)
BUN/CREAT SERPL: 13.7 (ref 10–20)
CALCIUM BLD-MCNC: 10 MG/DL (ref 8.7–10.4)
CHLORIDE SERPL-SCNC: 104 MMOL/L (ref 98–112)
CHOLEST SERPL-MCNC: 243 MG/DL (ref ?–200)
CO2 SERPL-SCNC: 31 MMOL/L (ref 21–32)
CREAT BLD-MCNC: 0.95 MG/DL
DEPRECATED RDW RBC AUTO: 41 FL (ref 35.1–46.3)
EGFRCR SERPLBLD CKD-EPI 2021: 68 ML/MIN/1.73M2 (ref 60–?)
EOSINOPHIL # BLD AUTO: 0.12 X10(3) UL (ref 0–0.7)
EOSINOPHIL NFR BLD AUTO: 2 %
ERYTHROCYTE [DISTWIDTH] IN BLOOD BY AUTOMATED COUNT: 12.9 % (ref 11–15)
EST. AVERAGE GLUCOSE BLD GHB EST-MCNC: 131 MG/DL (ref 68–126)
FASTING PATIENT LIPID ANSWER: YES
FASTING STATUS PATIENT QL REPORTED: YES
GLOBULIN PLAS-MCNC: 3 G/DL (ref 2.8–4.4)
GLUCOSE BLD-MCNC: 87 MG/DL (ref 70–99)
HBA1C MFR BLD: 6.2 % (ref ?–5.7)
HCT VFR BLD AUTO: 49.3 %
HDLC SERPL-MCNC: 81 MG/DL (ref 40–59)
HGB BLD-MCNC: 15.2 G/DL
IMM GRANULOCYTES # BLD AUTO: 0.01 X10(3) UL (ref 0–1)
IMM GRANULOCYTES NFR BLD: 0.2 %
LDLC SERPL CALC-MCNC: 151 MG/DL (ref ?–100)
LYMPHOCYTES # BLD AUTO: 1.78 X10(3) UL (ref 1–4)
LYMPHOCYTES NFR BLD AUTO: 29.2 %
MCH RBC QN AUTO: 26.7 PG (ref 26–34)
MCHC RBC AUTO-ENTMCNC: 30.8 G/DL (ref 31–37)
MCV RBC AUTO: 86.6 FL
MONOCYTES # BLD AUTO: 0.46 X10(3) UL (ref 0.1–1)
MONOCYTES NFR BLD AUTO: 7.6 %
NEUTROPHILS # BLD AUTO: 3.66 X10 (3) UL (ref 1.5–7.7)
NEUTROPHILS # BLD AUTO: 3.66 X10(3) UL (ref 1.5–7.7)
NEUTROPHILS NFR BLD AUTO: 60 %
NONHDLC SERPL-MCNC: 162 MG/DL (ref ?–130)
OSMOLALITY SERPL CALC.SUM OF ELEC: 285 MOSM/KG (ref 275–295)
PLATELET # BLD AUTO: 242 10(3)UL (ref 150–450)
POTASSIUM SERPL-SCNC: 4.2 MMOL/L (ref 3.5–5.1)
PROT SERPL-MCNC: 7.6 G/DL (ref 5.7–8.2)
RBC # BLD AUTO: 5.69 X10(6)UL
SODIUM SERPL-SCNC: 138 MMOL/L (ref 136–145)
TRIGL SERPL-MCNC: 66 MG/DL (ref 30–149)
TSI SER-ACNC: 4.09 MIU/ML (ref 0.55–4.78)
VIT D+METAB SERPL-MCNC: 30.5 NG/ML (ref 30–100)
VLDLC SERPL CALC-MCNC: 12 MG/DL (ref 0–30)
WBC # BLD AUTO: 6.1 X10(3) UL (ref 4–11)

## 2023-11-08 PROCEDURE — 85025 COMPLETE CBC W/AUTO DIFF WBC: CPT

## 2023-11-08 PROCEDURE — 80053 COMPREHEN METABOLIC PANEL: CPT

## 2023-11-08 PROCEDURE — 36415 COLL VENOUS BLD VENIPUNCTURE: CPT

## 2023-11-08 PROCEDURE — 83036 HEMOGLOBIN GLYCOSYLATED A1C: CPT

## 2023-11-08 PROCEDURE — 84443 ASSAY THYROID STIM HORMONE: CPT

## 2023-11-08 PROCEDURE — 82306 VITAMIN D 25 HYDROXY: CPT

## 2023-11-08 PROCEDURE — 80061 LIPID PANEL: CPT

## 2023-11-10 ENCOUNTER — IMMUNIZATION (OUTPATIENT)
Dept: LAB | Age: 63
End: 2023-11-10
Attending: EMERGENCY MEDICINE
Payer: COMMERCIAL

## 2023-11-10 DIAGNOSIS — Z23 NEED FOR VACCINATION: Primary | ICD-10-CM

## 2023-11-10 PROCEDURE — 90480 ADMN SARSCOV2 VAC 1/ONLY CMP: CPT

## 2023-11-13 ENCOUNTER — OFFICE VISIT (OUTPATIENT)
Dept: FAMILY MEDICINE CLINIC | Facility: CLINIC | Age: 63
End: 2023-11-13
Payer: COMMERCIAL

## 2023-11-13 ENCOUNTER — TELEPHONE (OUTPATIENT)
Dept: CASE MANAGEMENT | Age: 63
End: 2023-11-13

## 2023-11-13 VITALS
HEART RATE: 92 BPM | DIASTOLIC BLOOD PRESSURE: 85 MMHG | BODY MASS INDEX: 37.54 KG/M2 | WEIGHT: 242 LBS | SYSTOLIC BLOOD PRESSURE: 134 MMHG | HEIGHT: 67.4 IN

## 2023-11-13 DIAGNOSIS — E55.9 VITAMIN D DEFICIENCY: ICD-10-CM

## 2023-11-13 DIAGNOSIS — Z00.00 WELL ADULT EXAM: Primary | ICD-10-CM

## 2023-11-13 DIAGNOSIS — E66.9 OBESITY (BMI 30-39.9): ICD-10-CM

## 2023-11-13 DIAGNOSIS — F43.23 ADJUSTMENT DISORDER WITH MIXED ANXIETY AND DEPRESSED MOOD: ICD-10-CM

## 2023-11-13 DIAGNOSIS — I10 ESSENTIAL HYPERTENSION: ICD-10-CM

## 2023-11-13 DIAGNOSIS — I83.93 ASYMPTOMATIC VARICOSE VEINS OF BOTH LOWER EXTREMITIES: ICD-10-CM

## 2023-11-13 DIAGNOSIS — R91.1 RIGHT LOWER LOBE PULMONARY NODULE: ICD-10-CM

## 2023-11-13 DIAGNOSIS — Z82.49 FH: CAD (CORONARY ARTERY DISEASE): ICD-10-CM

## 2023-11-13 DIAGNOSIS — I25.10 ATHEROSCLEROSIS OF CORONARY ARTERY OF NATIVE HEART WITHOUT ANGINA PECTORIS, UNSPECIFIED VESSEL OR LESION TYPE: ICD-10-CM

## 2023-11-13 DIAGNOSIS — R73.03 PREDIABETES: ICD-10-CM

## 2023-11-13 DIAGNOSIS — E78.00 HYPERCHOLESTEREMIA: ICD-10-CM

## 2023-11-13 RX ORDER — ROSUVASTATIN CALCIUM 10 MG/1
10 TABLET, COATED ORAL NIGHTLY
Qty: 90 TABLET | Refills: 1 | Status: SHIPPED | OUTPATIENT
Start: 2023-11-13

## 2023-11-13 NOTE — TELEPHONE ENCOUNTER
Informed patient of 's plan of care. Patient verbalized understanding. Patient will schedule appointment with pulmonology for further evaluation.

## 2023-11-13 NOTE — TELEPHONE ENCOUNTER
Please inform patient CT chest has been denied.   She can follow-up with pulmonary for further evaluation

## 2023-11-13 NOTE — TELEPHONE ENCOUNTER
HI Dr. Dupree Medon        Status: DENIED        Reference number [id number]     A copy of the denial letter is filed under the MEDIA tab, reference for complete details. You may reach out to [address] [place], [place] [address]nadege Whitwell at [phone] to discuss decision. Please reach out to patient with plan of care. Thank you    The information below was obtained from the Ordering Provider and has not been independently verified by [address] [place] [address]. [address] [place] [address] assumes no responsibility for the accuracy of this information or for its consistency with the patient's medical record. Please call [phone] for all Urgent Requests. REQUESTED EXAMS      EXAM REQUEST STATUS REASON ACTION    Chest ([place]) - CT   Without Contrast   Non-Authorized  Criteria Not Met  Review Exam Withdraw Exam                    Clinical Rationale: Your doctor told us that you have a small spot on your lung. Your doctor ordered a CT scan of your chest to recheck this spot. A CT is a way to take pictures of the inside of your body. Follow up with a CT scan on such a small spot is not recommended. Based on the information we have, this test is not medically necessary. We used [address] [place] [address] Clinical Guideline titled Imaging of the Chest to make this decision. You may view this guideline at [email or web address] com/mbm-guidelines-radiology.

## 2023-11-17 ENCOUNTER — APPOINTMENT (OUTPATIENT)
Dept: CT IMAGING | Age: 63
End: 2023-11-17
Attending: FAMILY MEDICINE
Payer: COMMERCIAL

## 2023-11-17 ENCOUNTER — HOSPITAL ENCOUNTER (OUTPATIENT)
Dept: CV DIAGNOSTICS | Facility: HOSPITAL | Age: 63
Discharge: HOME | End: 2023-11-17
Attending: FAMILY MEDICINE
Payer: COMMERCIAL

## 2023-11-17 DIAGNOSIS — I10 ESSENTIAL HYPERTENSION: ICD-10-CM

## 2023-11-17 DIAGNOSIS — E78.00 HYPERCHOLESTEREMIA: ICD-10-CM

## 2023-11-17 DIAGNOSIS — Z82.49 FH: CAD (CORONARY ARTERY DISEASE): ICD-10-CM

## 2023-11-17 LAB
% OF MAX PREDICTED HR: 100 %
MAX DIASTOLIC BP: 83 MMHG
MAX HEART RATE: 166 BPM
MAX PREDICTED HEART RATE: 158 BPM
MAX SYSTOLIC BP: 241 MMHG
MAX WORK LOAD: 46

## 2023-11-17 PROCEDURE — 93350 STRESS TTE ONLY: CPT | Performed by: FAMILY MEDICINE

## 2023-11-17 PROCEDURE — 93016 CV STRESS TEST SUPVJ ONLY: CPT | Performed by: INTERNAL MEDICINE

## 2023-11-17 PROCEDURE — 93018 CV STRESS TEST I&R ONLY: CPT | Performed by: INTERNAL MEDICINE

## 2023-11-17 PROCEDURE — 93017 CV STRESS TEST TRACING ONLY: CPT | Performed by: FAMILY MEDICINE

## 2023-11-30 DIAGNOSIS — I10 ESSENTIAL HYPERTENSION: ICD-10-CM

## 2023-11-30 RX ORDER — VALSARTAN 320 MG/1
320 TABLET ORAL DAILY
Qty: 90 TABLET | Refills: 3 | Status: SHIPPED | OUTPATIENT
Start: 2023-11-30

## 2023-12-13 ENCOUNTER — HOSPITAL ENCOUNTER (OUTPATIENT)
Dept: MAMMOGRAPHY | Age: 63
Discharge: HOME | End: 2023-12-13
Attending: FAMILY MEDICINE
Payer: COMMERCIAL

## 2023-12-13 DIAGNOSIS — Z12.31 ENCOUNTER FOR SCREENING MAMMOGRAM FOR MALIGNANT NEOPLASM OF BREAST: ICD-10-CM

## 2023-12-13 PROCEDURE — 77063 BREAST TOMOSYNTHESIS BI: CPT | Performed by: FAMILY MEDICINE

## 2023-12-13 PROCEDURE — 77067 SCR MAMMO BI INCL CAD: CPT | Performed by: FAMILY MEDICINE

## 2023-12-27 DIAGNOSIS — I10 ESSENTIAL HYPERTENSION: ICD-10-CM

## 2023-12-27 RX ORDER — HYDROCHLOROTHIAZIDE 25 MG/1
25 TABLET ORAL DAILY
Qty: 90 TABLET | Refills: 0 | Status: SHIPPED | OUTPATIENT
Start: 2023-12-27

## 2023-12-28 NOTE — TELEPHONE ENCOUNTER
Refill Passed Per Protocol    Requested Prescriptions   Pending Prescriptions Disp Refills    HYDROCHLOROTHIAZIDE 25 MG Oral Tab [Pharmacy Med Name: HYDROCHLOROTHIAZIDE 25 MG TAB] 90 tablet 3     Sig: Take 1 tablet (25 mg total) by mouth in the morning.        Hypertensive Medications Protocol Passed - 12/27/2023 12:04 AM        Passed - In person appointment in the past 12 or next 3 months     Recent Outpatient Visits              1 month ago Well adult exam    Agapito Thompson MD    Office Visit    6 months ago Postmenopausal bleeding    [address], [address] Jaren Mejía MD    Office Visit    7 months ago Postmenopausal bleeding    [address], [address] Jaren Mejía MD    Office Visit    7 months ago Primary osteoarthritis of right knee    [address], [address] [facility],[address], Cira Wilcox MD    Office Visit    8 months ago Acute pain of right knee    [address] [facility], Yong Zayas MD    Office Visit          Future Appointments         Provider Department Appt Notes    In 1 month Saravanan Cottrell MD [address], Jovanna 3 month follow up    In 1 month [address] [facility], [address] [address], DO [address], [facility] - Right lower lobe pulmonary nodule   (Policy informed)               Passed - Last BP reading less than 140/90     BP Readings from Last 1 Encounters:   11/13/23 134/85               Passed - CMP or BMP in past 6 months     Recent Results (from the past 4392 hour(s))   Comp Metabolic Panel (14)    Collection Time: 11/08/23  9:59 AM   Result Value Ref Range    Glucose 87 70 - 99 mg/dL    Sodium 138 136 - 145 mmol/L    Potassium 4.2 3.5 - 5.1 mmol/L    Chloride 104 98 - 112 mmol/L    CO2 31.0 21.0 - 32.0 mmol/L    Anion Gap 3 0 - 18 mmol/L    BUN 13 9 - 23 mg/dL    Creatinine 0.95 0.55 - 1.02 mg/dL    BUN/CREA Ratio 13.7 10.0 - 20.0    Calcium, Total 10.0 8.7 - 10.4 mg/dL    Calculated Osmolality 285 275 - 295 mOsm/kg    eGFR-Cr 68 >=60 mL/min/1.73m2    ALT 23 10 - 49 U/L    AST 30 <=34 U/L    Alkaline Phosphatase 114 50 - 130 U/L    Bilirubin, Total 0.7 0.2 - 1.1 mg/dL    Total Protein 7.6 5.7 - 8.2 g/dL    Albumin 4.6 3.2 - 4.8 g/dL    Globulin  3.0 2.8 - 4.4 g/dL    A/G Ratio 1.5 1.0 - 2.0    Patient Fasting for CMP? Yes      *Note: Due to a large number of results and/or encounters for the requested time period, some results have not been displayed. A complete set of results can be found in Results Review.                Passed - In person appointment or virtual visit in the past 6 months     Recent Outpatient Visits              1 month ago Well adult exam    Marisela Allen MD    Office Visit    6 months ago Postmenopausal bleeding    Jaida Grant, [address] [facility] [address], April - OB/GYN Kalee Gerardo MD    Office Visit    7 months ago Postmenopausal bleeding    Jaida Grant, [address] [facility],[address], April - OB/GYN Kalee Gerardo MD    Office Visit    7 months ago Primary osteoarthritis of right knee    Jaida Grant, [address] [facility],[address], Aimee Barton MD    Office Visit    8 months ago Acute pain of right knee    Karel Hernandez MD    Office Visit          Future Appointments         Provider Department Appt Notes    In 1 month MD Jaida Clarke [place] 3 month follow up    In 1 month [address] [facility], [address] [address], Manda Mendenhall [address] - Right lower lobe pulmonary nodule   (Policy informed)               Passed - EGFRCR or GFRAA > 50     GFR Evaluation  EGFRCR: 68 , resulted on 11/8/2023               Future Appointments         Provider Department Appt Notes    In 1 month MD [facility]    In 1 month [place], [address] [address], Manda [address] - Right lower lobe pulmonary nodule   (Policy informed)          Recent Outpatient Visits              1 month ago Well adult exam    Simone Villagomez MD    Office Visit    6 months ago Postmenopausal bleeding    Drea Rios, [address] [facility] Nereyda Moreno MD    Office Visit    7 months ago Postmenopausal bleeding    Drea Rios, [address] [facility] Nereyda Moreno MD    Office Visit    7 months ago Primary osteoarthritis of right knee    [address] [facility], Luis Eduardo Brumfield MD    Office Visit    8 months ago Acute pain of right knee    [address] [facility], Evens Ruano MD    Office Visit

## 2024-02-13 ENCOUNTER — LAB ENCOUNTER (OUTPATIENT)
Dept: LAB | Age: 64
End: 2024-02-13
Attending: FAMILY MEDICINE
Payer: COMMERCIAL

## 2024-02-13 ENCOUNTER — OFFICE VISIT (OUTPATIENT)
Dept: FAMILY MEDICINE CLINIC | Facility: CLINIC | Age: 64
End: 2024-02-13
Payer: COMMERCIAL

## 2024-02-13 VITALS
HEART RATE: 94 BPM | BODY MASS INDEX: 35.99 KG/M2 | HEIGHT: 67.4 IN | SYSTOLIC BLOOD PRESSURE: 130 MMHG | WEIGHT: 232 LBS | DIASTOLIC BLOOD PRESSURE: 80 MMHG | OXYGEN SATURATION: 97 %

## 2024-02-13 DIAGNOSIS — F43.21 GRIEF REACTION: ICD-10-CM

## 2024-02-13 DIAGNOSIS — I10 ESSENTIAL HYPERTENSION: Primary | ICD-10-CM

## 2024-02-13 DIAGNOSIS — I49.8 FLUTTERING HEART: ICD-10-CM

## 2024-02-13 DIAGNOSIS — R73.03 PREDIABETES: ICD-10-CM

## 2024-02-13 DIAGNOSIS — I25.10 ATHEROSCLEROSIS OF CORONARY ARTERY OF NATIVE HEART WITHOUT ANGINA PECTORIS, UNSPECIFIED VESSEL OR LESION TYPE: ICD-10-CM

## 2024-02-13 DIAGNOSIS — E78.00 HYPERCHOLESTEREMIA: ICD-10-CM

## 2024-02-13 LAB
ALBUMIN SERPL-MCNC: 4.4 G/DL (ref 3.2–4.8)
ALBUMIN/GLOB SERPL: 1.4 {RATIO} (ref 1–2)
ALP LIVER SERPL-CCNC: 106 U/L
ALT SERPL-CCNC: 17 U/L
ANION GAP SERPL CALC-SCNC: 3 MMOL/L (ref 0–18)
AST SERPL-CCNC: 24 U/L (ref ?–34)
ATRIAL RATE: 80 BPM
BASOPHILS # BLD AUTO: 0.04 X10(3) UL (ref 0–0.2)
BASOPHILS NFR BLD AUTO: 0.6 %
BILIRUB SERPL-MCNC: 0.4 MG/DL (ref 0.2–1.1)
BUN BLD-MCNC: 11 MG/DL (ref 9–23)
BUN/CREAT SERPL: 11.1 (ref 10–20)
CALCIUM BLD-MCNC: 9.9 MG/DL (ref 8.7–10.4)
CHLORIDE SERPL-SCNC: 105 MMOL/L (ref 98–112)
CHOLEST SERPL-MCNC: 195 MG/DL (ref ?–200)
CO2 SERPL-SCNC: 30 MMOL/L (ref 21–32)
CREAT BLD-MCNC: 0.99 MG/DL
DEPRECATED RDW RBC AUTO: 39.2 FL (ref 35.1–46.3)
EGFRCR SERPLBLD CKD-EPI 2021: 64 ML/MIN/1.73M2 (ref 60–?)
EOSINOPHIL # BLD AUTO: 0.19 X10(3) UL (ref 0–0.7)
EOSINOPHIL NFR BLD AUTO: 2.9 %
ERYTHROCYTE [DISTWIDTH] IN BLOOD BY AUTOMATED COUNT: 12.8 % (ref 11–15)
EST. AVERAGE GLUCOSE BLD GHB EST-MCNC: 131 MG/DL (ref 68–126)
FASTING PATIENT LIPID ANSWER: NO
FASTING STATUS PATIENT QL REPORTED: NO
GLOBULIN PLAS-MCNC: 3.2 G/DL (ref 2.8–4.4)
GLUCOSE BLD-MCNC: 86 MG/DL (ref 70–99)
HBA1C MFR BLD: 6.2 % (ref ?–5.7)
HCT VFR BLD AUTO: 48 %
HDLC SERPL-MCNC: 69 MG/DL (ref 40–59)
HGB BLD-MCNC: 15.2 G/DL
IMM GRANULOCYTES # BLD AUTO: 0.01 X10(3) UL (ref 0–1)
IMM GRANULOCYTES NFR BLD: 0.2 %
LDLC SERPL CALC-MCNC: 108 MG/DL (ref ?–100)
LYMPHOCYTES # BLD AUTO: 1.84 X10(3) UL (ref 1–4)
LYMPHOCYTES NFR BLD AUTO: 28.2 %
MCH RBC QN AUTO: 26.6 PG (ref 26–34)
MCHC RBC AUTO-ENTMCNC: 31.7 G/DL (ref 31–37)
MCV RBC AUTO: 83.9 FL
MONOCYTES # BLD AUTO: 0.51 X10(3) UL (ref 0.1–1)
MONOCYTES NFR BLD AUTO: 7.8 %
NEUTROPHILS # BLD AUTO: 3.94 X10 (3) UL (ref 1.5–7.7)
NEUTROPHILS # BLD AUTO: 3.94 X10(3) UL (ref 1.5–7.7)
NEUTROPHILS NFR BLD AUTO: 60.3 %
NONHDLC SERPL-MCNC: 126 MG/DL (ref ?–130)
OSMOLALITY SERPL CALC.SUM OF ELEC: 285 MOSM/KG (ref 275–295)
P AXIS: 52 DEGREES
P-R INTERVAL: 162 MS
PLATELET # BLD AUTO: 229 10(3)UL (ref 150–450)
POTASSIUM SERPL-SCNC: 4.2 MMOL/L (ref 3.5–5.1)
PROT SERPL-MCNC: 7.6 G/DL (ref 5.7–8.2)
Q-T INTERVAL: 378 MS
QRS DURATION: 64 MS
QTC CALCULATION (BEZET): 435 MS
R AXIS: 31 DEGREES
RBC # BLD AUTO: 5.72 X10(6)UL
SODIUM SERPL-SCNC: 138 MMOL/L (ref 136–145)
T AXIS: 37 DEGREES
TRIGL SERPL-MCNC: 101 MG/DL (ref 30–149)
TSI SER-ACNC: 2.78 MIU/ML (ref 0.55–4.78)
VENTRICULAR RATE: 80 BPM
VLDLC SERPL CALC-MCNC: 17 MG/DL (ref 0–30)
WBC # BLD AUTO: 6.5 X10(3) UL (ref 4–11)

## 2024-02-13 PROCEDURE — 36415 COLL VENOUS BLD VENIPUNCTURE: CPT

## 2024-02-13 PROCEDURE — 3079F DIAST BP 80-89 MM HG: CPT | Performed by: FAMILY MEDICINE

## 2024-02-13 PROCEDURE — 80061 LIPID PANEL: CPT

## 2024-02-13 PROCEDURE — 84443 ASSAY THYROID STIM HORMONE: CPT

## 2024-02-13 PROCEDURE — 3075F SYST BP GE 130 - 139MM HG: CPT | Performed by: FAMILY MEDICINE

## 2024-02-13 PROCEDURE — 93005 ELECTROCARDIOGRAM TRACING: CPT

## 2024-02-13 PROCEDURE — 83036 HEMOGLOBIN GLYCOSYLATED A1C: CPT

## 2024-02-13 PROCEDURE — 85025 COMPLETE CBC W/AUTO DIFF WBC: CPT

## 2024-02-13 PROCEDURE — 3008F BODY MASS INDEX DOCD: CPT | Performed by: FAMILY MEDICINE

## 2024-02-13 PROCEDURE — 93010 ELECTROCARDIOGRAM REPORT: CPT | Performed by: STUDENT IN AN ORGANIZED HEALTH CARE EDUCATION/TRAINING PROGRAM

## 2024-02-13 PROCEDURE — 99214 OFFICE O/P EST MOD 30 MIN: CPT | Performed by: FAMILY MEDICINE

## 2024-02-13 PROCEDURE — 80053 COMPREHEN METABOLIC PANEL: CPT

## 2024-02-13 NOTE — PROGRESS NOTES
HPI:    Patient ID: Stephanie Cespedes is a 63 year old female.      Hypertension  Pertinent negatives include no chest pain, headaches, palpitations or shortness of breath.       Chief Complaint   Patient presents with    Cholesterol     Follow up       Hypertension     Follow up       Other     Chest fluttering has been feeling it since last month has been back and fort in [place] due to to her Dad been in and out the hospital also recently  lost her brother that she had.        Wt Readings from Last 6 Encounters:   02/13/24 232 lb (105.2 kg)   11/13/23 242 lb (109.8 kg)   06/07/23 235 lb (106.6 kg)   05/23/23 233 lb (105.7 kg)   05/09/23 235 lb (106.6 kg)   04/17/23 235 lb (106.6 kg)     BP Readings from Last 3 Encounters:   02/13/24 130/80   11/13/23 134/85   06/07/23 (!) 152/93     Has been taking of her elderly father, has been flying back and forth to [place].  Also lost her only brother at age 58 yrs last month. He lived in [place].    Patient is very tearful.    Has had some fluttering of her heart   No chest pain .  Seems to be more aware   Noted this when she was in [facility]  Lasted few secs. Happened maybe 2 x week for about a month    No dizziness/ no shortness of breath       Review of Systems   Constitutional:  Negative for chills and fever.   Eyes:  Negative for visual disturbance.   Respiratory:  Negative for cough, shortness of breath and wheezing.    Cardiovascular:  Negative for chest pain, palpitations and leg swelling.   Genitourinary:  Negative for decreased urine volume and difficulty urinating.   Neurological:  Negative for dizziness, tremors, seizures, weakness, light-headedness, numbness and headaches.       /80   Pulse 94   Ht 5' 7.4\" (1.712 m)   Wt 232 lb (105.2 kg)   SpO2 97%   BMI 35.91 kg/m²          Current Outpatient Medications   Medication Sig Dispense Refill    hydroCHLOROthiazide 25 MG Oral Tab Take 1 tablet (25 mg total) by mouth daily. 90 tablet 0    valsartan 320 MG Oral  Tab Take 1 tablet (320 mg total) by mouth daily. 90 tablet 3    Cyanocobalamin (VITAMIN B 12 OR) 1,000 mcg.      rosuvastatin (CRESTOR) 10 MG Oral Tab Take 1 tablet (10 mg total) by mouth nightly. 90 tablet 1    Magnesium 400 MG Oral Tab Take by mouth.      levocetirizine 5 MG Oral Tab Take 1 tablet (5 mg total) by mouth daily. 90 tablet 3    Vitamin D3, Cholecalciferol, 50 MCG (2000 UT) Oral Cap Take 1 capsule (2,000 Units total) by mouth daily.       Allergies:No Known Allergies   PHYSICAL EXAM:     Chief Complaint   Patient presents with    Cholesterol     Follow up       Hypertension     Follow up       Other     Chest fluttering has been feeling it since last month has been back and fort in [place] due to to her Dad been in and out the hospital also recently  lost her brother that she had.       Physical Exam  Vitals and nursing note reviewed.   Constitutional:       Appearance: She is well-developed.   Eyes:      Pupils: Pupils are equal, round, and reactive to light.   Neck:      Thyroid: No thyromegaly.   Cardiovascular:      Rate and Rhythm: Normal rate and regular rhythm.      Heart sounds: No murmur heard.  Pulmonary:      Effort: Pulmonary effort is normal.      Breath sounds: Normal breath sounds. No wheezing.   Abdominal:      Palpations: There is no mass.      Tenderness: There is no abdominal tenderness. There is no right CVA tenderness or left CVA tenderness.   Musculoskeletal:      Cervical back: Normal range of motion and neck supple.      Right lower leg: No edema.      Left lower leg: No edema.   Skin:     General: Skin is warm and dry.      Findings: No rash.   Neurological:      Mental Status: She is alert and oriented to person, place, and time.                ASSESSMENT/PLAN:     Encounter Diagnoses   Name Primary?    Essential hypertension Yes    Hypercholesteremia     Prediabetes     Fluttering heart     Atherosclerosis of coronary artery of native heart without angina pectoris,  unspecified vessel or lesion type     Grief reaction        1. Essential hypertension  Stable condition  Reviewed medications  Continue current medication management   All questions answered to the best of my ability.    - [facility]    2. Hypercholesteremia  Check labs  Stable condition  Reviewed medications  Continue current medication management   All questions answered to the best of my ability.    - Lipid Panel; Future  - Comp Metabolic Panel (14); Future  - [facility]    3. Prediabetes  Continue to work on diet and exercise  - Hemoglobin A1C; Future    4. Fluttering heart  Check labs, ekg  - EKG 12 Lead; Future  - [facility]  - TSH W Reflex To Free T4; Future  - CBC With Differential With Platelet; Future    5. Atherosclerosis of coronary artery of native heart without angina pectoris, unspecified vessel or lesion type  Follow up cardiology  Stress echo normal 11/23  - [facility]    6. Grief reaction  Suggests BHI if no better in next few months      Orders Placed This Encounter   Procedures    Lipid Panel    Comp Metabolic Panel (14)    Hemoglobin A1C    TSH W Reflex To Free T4    CBC With Differential With Platelet         The above note was creating using Dragon speech recognition technology. Please excuse any typos    Meds This Visit:  Requested Prescriptions      No prescriptions requested or ordered in this encounter       Imaging & Referrals:  [facility]       ID#[id number]

## 2024-02-16 ENCOUNTER — OFFICE VISIT (OUTPATIENT)
Dept: PULMONOLOGY | Facility: CLINIC | Age: 64
End: 2024-02-16

## 2024-02-16 VITALS
HEART RATE: 101 BPM | OXYGEN SATURATION: 98 % | SYSTOLIC BLOOD PRESSURE: 139 MMHG | DIASTOLIC BLOOD PRESSURE: 85 MMHG | WEIGHT: 233 LBS | BODY MASS INDEX: 36.57 KG/M2 | HEIGHT: 67 IN

## 2024-02-16 DIAGNOSIS — R91.1 LUNG NODULE: Primary | ICD-10-CM

## 2024-02-16 DIAGNOSIS — I25.10 ATHEROSCLEROSIS OF CORONARY ARTERY OF NATIVE HEART WITHOUT ANGINA PECTORIS, UNSPECIFIED VESSEL OR LESION TYPE: ICD-10-CM

## 2024-02-16 DIAGNOSIS — I10 ESSENTIAL HYPERTENSION: ICD-10-CM

## 2024-02-16 DIAGNOSIS — E78.00 HYPERCHOLESTEREMIA: ICD-10-CM

## 2024-02-16 PROCEDURE — 3075F SYST BP GE 130 - 139MM HG: CPT | Performed by: INTERNAL MEDICINE

## 2024-02-16 PROCEDURE — 3079F DIAST BP 80-89 MM HG: CPT | Performed by: INTERNAL MEDICINE

## 2024-02-16 PROCEDURE — 3008F BODY MASS INDEX DOCD: CPT | Performed by: INTERNAL MEDICINE

## 2024-02-16 PROCEDURE — 99204 OFFICE O/P NEW MOD 45 MIN: CPT | Performed by: INTERNAL MEDICINE

## 2024-02-16 RX ORDER — ROSUVASTATIN CALCIUM 10 MG/1
10 TABLET, COATED ORAL NIGHTLY
Qty: 90 TABLET | Refills: 3 | Status: SHIPPED | OUTPATIENT
Start: 2024-02-16

## 2024-02-16 RX ORDER — HYDROCHLOROTHIAZIDE 25 MG/1
25 TABLET ORAL DAILY
Qty: 90 TABLET | Refills: 3 | Status: SHIPPED | OUTPATIENT
Start: 2024-02-16

## 2024-02-16 NOTE — PATIENT INSTRUCTIONS
You may take over-the-counter Zyrtec or Xyzal or equivalent once daily and see if it helps with postnasal drip related cough symptoms

## 2024-02-16 NOTE — H&P
Referring Physician  Reena Helm MD    Chief Complaint  Lung nodule    History of Present Illness  Presents today for evaluation of lung nodule.  Admits to prior history of known lung nodules.  Denies history of tobacco abuse.  Denies history of known lung disease.  Some occasional postnasal drip related cough.    Review of Systems  Constitutional: denies weight loss, fevers, chills, weakness, fatigue, recent illness  HEENT: denies epistaxis, sore throat, postnasal drip  Cardio: denies chest pain, chest pressure, palpitations  Respiratory: denies dyspnea, cough, wheezing, hemoptysis   GI: denies nausea, vomiting, abdominal pain  : denies dysuria, hematuria  Musculoskeletal: denies arthralgia, myalgia  Integumentary: denies rash, itching  Neurological: denies syncope, weakness, dizziness,   Psychiatric: denies depression, anxiety  Hematologic: denies bruising    Past Medical History  Past Medical History:   Diagnosis Date    Other and unspecified hyperlipidemia     Primary hypothyroidism     Unspecified essential hypertension         Surgical History  Past Surgical History:   Procedure Laterality Date          2    COLONOSCOPY  2014    ELECTROCARDIOGRAM, COMPLETE  2013    scanned to media tab, 2013       Family History  Family History   Problem Relation Age of Onset    Hypertension Father     Cancer Father         intestinal cancer    Hypertension Mother     Diabetes Mother     Cancer Mother 75        pancreatic     Other (pancreatic canc) Mother     Hypertension Other     Heart Disease Other     Heart Disorder Maternal Grandmother 76    Asthma Paternal Grandmother         Social History  Tobacco: Denies  Alcohol: Denies significant intake  Illicit Drugs: Denies    Medications  Current Outpatient Medications on File Prior to Visit   Medication Sig Dispense Refill    valsartan 320 MG Oral Tab Take 1 tablet (320 mg total) by mouth daily. 90 tablet 3    Cyanocobalamin (VITAMIN B 12 OR)  1,000 mcg.      Magnesium 400 MG Oral Tab Take by mouth.      levocetirizine 5 MG Oral Tab Take 1 tablet (5 mg total) by mouth daily. 90 tablet 3    Vitamin D3, Cholecalciferol, 50 MCG (2000 UT) Oral Cap Take 1 capsule (2,000 Units total) by mouth daily.       No current facility-administered medications on file prior to visit.       Allergies  No Known Allergies    Physical Exam  Constitutional: no acute distress  HEENT: PERRL  Neck: supple, no JVD  Cardio: RRR, S1 S2  Respiratory: clear to auscultation bilaterally, no wheezing, rales, rhonchi, crackles  GI: abdomen soft, non tender, active bowel sounds, no organomegaly  Extremities: no clubbing, cyanosis, edema  Neurologic: no gross motor deficits  Skin: warm, dry  Lymphatic: no supraclavicular lymphadenopathy     Imaging  Recent CT chest on 11/12/2022 with stable left lower lobe lung nodule measuring 3 mm in size unchanged from October 2020.  Stable right millimeter lower lobe lung nodule seen.    Pulmonary Function Testing  None available to review    Assessment  1.  Lung nodules    Plan  -Patient presents today for evaluation of lung nodules.  I reviewed her CT chest most recently from November 2022 with stable lung nodule seen.  She is requesting repeat CT chest which I have ordered for her.  No suspicious characteristics of lung nodules or significant history of tobacco abuse.    Renetta Gonsalves DO  Pulmonary Critical Care Medicine  [facility]  2/16/2024  11:22 AM

## 2024-02-28 ENCOUNTER — TELEPHONE (OUTPATIENT)
Dept: PULMONOLOGY | Facility: CLINIC | Age: 64
End: 2024-02-28

## 2024-02-28 ENCOUNTER — HOSPITAL ENCOUNTER (OUTPATIENT)
Dept: CT IMAGING | Age: 64
Discharge: HOME | End: 2024-02-28
Attending: INTERNAL MEDICINE
Payer: COMMERCIAL

## 2024-02-28 DIAGNOSIS — R91.1 LUNG NODULE: ICD-10-CM

## 2024-02-28 PROCEDURE — 71250 CT THORAX DX C-: CPT | Performed by: INTERNAL MEDICINE

## 2024-02-28 NOTE — TELEPHONE ENCOUNTER
Per appointment notes for CT Chest:    Attempted to reach out to patient by phone and/or Mychart.  Insurance will not cover without approval.  PATIENT CANNOT PROCEED.  Please have patient reach out to provider for next steps..  Pt said she talked with [employer] today and they told her to proceed. She did not get an approval number but will call them back to get one. She understands that if insurance does not pay, she will be resprons.. paige /Vitor MACHADO

## 2024-02-28 NOTE — TELEPHONE ENCOUNTER
Lefty from [facility] calling regards PA for CT that is still in progress, that pt has scheduled for today at 4:30pm. If pt can be called with an update asap. Please call for it to be expedited.     Order ID: [id number]  Hotline #: [phone] (Ganesh)

## 2024-02-28 NOTE — TELEPHONE ENCOUNTER
She does not meet criteria that I can argue for. Peer to peer.  If she wants to obtain CT and pay out-of-pocket that is a different story.   Not Applicable

## 2024-02-28 NOTE — TELEPHONE ENCOUNTER
[facility] with Raquel, Nurse Reviewer[facility] states patient does not meet criteria for follow up CT Chest, she does not have history of smoking or environmental exposure.  Patient does have family history of pancreatic cancer which qualifies her for Peer to Peer.    Dr. Gonsalves - Do you want to complete a peer to peer for patient's CT Chest scheduled for today? If agreeable to peer to peer, please call Ganesh at [phone], order ID [id number].

## 2024-02-29 ENCOUNTER — PATIENT MESSAGE (OUTPATIENT)
Dept: PULMONOLOGY | Facility: CLINIC | Age: 64
End: 2024-02-29

## 2024-02-29 NOTE — TELEPHONE ENCOUNTER
From: Stephanie Cespedes  To: Renetta Gonsalves  Sent: 2/29/2024 10:53 AM CST  Subject: test results    Hi Dr. Caldwell,    Unfortunately, the test appears to have been denied, despite conversations with [employer] several times last week and as late as yesterday, hours before my appointment and even being advised what my co-pay would be!     Nevertheless, as I have received test results today, I look forward to receiving your interpretations of the results. It seems the nodule is unchanged, however, I would appreciate your comments, particularly if I should continue having this checked and/or what, if any, changes I should look out for, such as the cough I have despite being on XYZAL for several years now.

## 2024-04-01 ENCOUNTER — TELEPHONE (OUTPATIENT)
Dept: FAMILY MEDICINE CLINIC | Facility: CLINIC | Age: 64
End: 2024-04-01

## 2024-04-01 NOTE — TELEPHONE ENCOUNTER
As long as seen by pulmonary and cardiology and not considered significant, can occur from acid reflux as well.   Can follow up if symptoms persist and perhaps a video

## 2024-04-01 NOTE — TELEPHONE ENCOUNTER
Dr. Helm:  Patient states family has been telling her that she is wheezing in her sleep. Is unable to tell if in her nose or chest. States has discussed this with you in the past. Also, mentioned to Dr. Gonsalves and felt it was \"brushed off.\" Would like a referral  to ENT. Did recommend to patient to have family member record her when this happens. Patient agreed. Referral pending if appropriate. Patient is trying to be proactive. Thank you

## 2024-04-08 ENCOUNTER — TELEPHONE (OUTPATIENT)
Dept: FAMILY MEDICINE CLINIC | Facility: CLINIC | Age: 64
End: 2024-04-08

## 2024-04-08 DIAGNOSIS — Z11.1 TUBERCULOSIS SCREENING: Primary | ICD-10-CM

## 2024-04-15 ENCOUNTER — NURSE ONLY (OUTPATIENT)
Dept: FAMILY MEDICINE CLINIC | Facility: CLINIC | Age: 64
End: 2024-04-15
Payer: COMMERCIAL

## 2024-04-15 DIAGNOSIS — Z23 NEED FOR VACCINATION: Primary | ICD-10-CM

## 2024-04-15 PROCEDURE — 90471 IMMUNIZATION ADMIN: CPT | Performed by: FAMILY MEDICINE

## 2024-04-15 PROCEDURE — 90715 TDAP VACCINE 7 YRS/> IM: CPT | Performed by: FAMILY MEDICINE

## 2024-05-09 ENCOUNTER — PATIENT MESSAGE (OUTPATIENT)
Dept: FAMILY MEDICINE CLINIC | Facility: CLINIC | Age: 64
End: 2024-05-09

## 2024-06-12 ENCOUNTER — OFFICE VISIT (OUTPATIENT)
Dept: FAMILY MEDICINE CLINIC | Facility: CLINIC | Age: 64
End: 2024-06-12
Payer: COMMERCIAL

## 2024-06-12 VITALS
SYSTOLIC BLOOD PRESSURE: 150 MMHG | WEIGHT: 234 LBS | BODY MASS INDEX: 36.73 KG/M2 | HEART RATE: 79 BPM | HEIGHT: 67 IN | DIASTOLIC BLOOD PRESSURE: 90 MMHG

## 2024-06-12 DIAGNOSIS — N39.46 MIXED STRESS AND URGE URINARY INCONTINENCE: ICD-10-CM

## 2024-06-12 DIAGNOSIS — I10 ESSENTIAL HYPERTENSION: Primary | ICD-10-CM

## 2024-06-12 PROCEDURE — 3077F SYST BP >= 140 MM HG: CPT | Performed by: FAMILY MEDICINE

## 2024-06-12 PROCEDURE — 3080F DIAST BP >= 90 MM HG: CPT | Performed by: FAMILY MEDICINE

## 2024-06-12 PROCEDURE — 3008F BODY MASS INDEX DOCD: CPT | Performed by: FAMILY MEDICINE

## 2024-06-12 PROCEDURE — 99213 OFFICE O/P EST LOW 20 MIN: CPT | Performed by: FAMILY MEDICINE

## 2024-06-12 RX ORDER — MAGNESIUM OXIDE 400 MG/1
TABLET ORAL
COMMUNITY
Start: 2024-02-19

## 2024-06-12 NOTE — PROGRESS NOTES
HPI:    Patient ID: Stephanie Cespedes is a 63 year old female.      Blood Pressure  Pertinent negatives include no chest pain, chills, coughing, fever, headaches, numbness or weakness.   Skin  Pertinent negatives include no chest pain, chills, coughing, fever, headaches, numbness or weakness.       Chief Complaint   Patient presents with    Cholesterol     Follow up       Blood Pressure     Follow up       Other     Would like to discuss if she should have a full body scan also wants to know wish is the best vitamin to take, discuss tumeric and if theres a test to check inflammation.     Skin     Has been feeling itchiness on whole body also moles on left side of neck        Wt Readings from Last 6 Encounters:   24 234 lb (106.1 kg)   24 233 lb (105.7 kg)   24 232 lb (105.2 kg)   23 242 lb (109.8 kg)   23 235 lb (106.6 kg)   23 233 lb (105.7 kg)     BP Readings from Last 3 Encounters:   24 150/90   24 139/85   24 130/80     Taking blood pressure meds.  Wonders about scans and tests  Lost her brother recently, had copd, had pacemaker,  at age 59. Was told he had a weakened heart.    Patient saw Dr Bah 3/2024. (Cardiology)    Takes b12, magnesium 400mg        Review of Systems   Constitutional:  Negative for chills and fever.   Eyes:  Negative for visual disturbance.   Respiratory:  Negative for cough, shortness of breath and wheezing.    Cardiovascular:  Negative for chest pain, palpitations and leg swelling.   Genitourinary:  Negative for decreased urine volume and difficulty urinating.        Sometimes has urge and stress incontinence    Neurological:  Negative for dizziness, tremors, seizures, weakness, light-headedness, numbness and headaches.       /90   Pulse 79   Ht 5' 7\" (1.702 m)   Wt 234 lb (106.1 kg)   BMI 36.65 kg/m²          Current Outpatient Medications   Medication Sig Dispense Refill    magnesium oxide 400 MG Oral Tab magnesium 400 mg  (as magnesium oxide) tablet, [RxNorm: 118152]      rosuvastatin (CRESTOR) 10 MG Oral Tab Take 1 tablet (10 mg total) by mouth nightly. 90 tablet 3    hydroCHLOROthiazide 25 MG Oral Tab Take 1 tablet (25 mg total) by mouth daily. 90 tablet 3    valsartan 320 MG Oral Tab Take 1 tablet (320 mg total) by mouth daily. 90 tablet 3    Cyanocobalamin (VITAMIN B 12 OR) 1,000 mcg.      Magnesium 400 MG Oral Tab Take by mouth.      levocetirizine 5 MG Oral Tab Take 1 tablet (5 mg total) by mouth daily. 90 tablet 3    Vitamin D3, Cholecalciferol, 50 MCG (2000 UT) Oral Cap Take 1 capsule (2,000 Units total) by mouth daily.       Allergies:No Known Allergies   PHYSICAL EXAM:     Chief Complaint   Patient presents with    Cholesterol     Follow up       Blood Pressure     Follow up       Other     Would like to discuss if she should have a full body scan also wants to know wish is the best vitamin to take, discuss tumeric and if theres a test to check inflammation.     Skin     Has been feeling itchiness on whole body also moles on left side of neck       Physical Exam  Vitals and nursing note reviewed.   Constitutional:       Appearance: She is well-developed.   Eyes:      Pupils: Pupils are equal, round, and reactive to light.   Neck:      Thyroid: No thyromegaly.   Cardiovascular:      Rate and Rhythm: Normal rate and regular rhythm.      Heart sounds: No murmur heard.  Pulmonary:      Effort: Pulmonary effort is normal.      Breath sounds: Normal breath sounds. No wheezing.   Abdominal:      Palpations: There is no mass.      Tenderness: There is no abdominal tenderness. There is no right CVA tenderness or left CVA tenderness.   Musculoskeletal:      Cervical back: Normal range of motion and neck supple.      Right lower leg: No edema.      Left lower leg: No edema.   Skin:     General: Skin is warm and dry.      Findings: No rash.   Neurological:      Mental Status: She is alert and oriented to person, place, and time.                 ASSESSMENT/PLAN:     Encounter Diagnoses   Name Primary?    Essential hypertension Yes    Mixed stress and urge urinary incontinence        1. Essential hypertension  Elevated  Send me readings in 1-2 weeks  Low salt diet     2. Mixed stress and urge urinary incontinence  Pelvic floor therapy  - PHYSICAL THERAPY - INTERNAL      No orders of the defined types were placed in this encounter.        The above note was creating using Dragon speech recognition technology. Please excuse any typos    Meds This Visit:  Requested Prescriptions      No prescriptions requested or ordered in this encounter       Imaging & Referrals:  PHYSICAL THERAPY - INTERNAL       ID#[id number]

## 2024-08-22 ENCOUNTER — NURSE TRIAGE (OUTPATIENT)
Dept: FAMILY MEDICINE CLINIC | Facility: CLINIC | Age: 64
End: 2024-08-22

## 2024-08-22 NOTE — TELEPHONE ENCOUNTER
Action Requested: Summary for Provider     []  Critical Lab, Recommendations Needed  [] Need Additional Advice  [x]   FYI    []   Need Orders  [] Need Medications Sent to Pharmacy  []  Other     SUMMARY: Patient stated that she has been having right elbow pain for the last 2 weeks. No swelling or injury. Able to lift things. Also for the last 1 week has been having a heavy feeling in her legs and hip. Takes tylenol but only helps alittle. No other symptoms. Patient taking rosuvastatin daily. Stated that not sure if her pains are related to the medication. States nothing else new. Advised patient that sometimes can have muscle pains related to cholesterol statin medications. Advised patient that can see if related to medication or something else going on by not taking medication for a few days and see if pain goes away. If pain goes away and if resumes medication and pain returns then could be related to the medication and to let us know so doctor can change the medication. If not related to the medication then to call back to schedule an appointment. Patient agreed.   Reason for call: Muscle Pain (Right elbow pain, hip/legs feels heavy)  Onset: Aug 15, 2024    Reason for Disposition   MILD pain (e.g., does not interfere with normal activities) and present > 7 days   MILD or MODERATE muscle aches or pain and taking a statin medicine (a lipid or cholesterol lowering drug)    Protocols used: Elbow Pain-A-[place], Muscle Aches and Body Pain-A-OH

## 2024-09-03 ENCOUNTER — NURSE TRIAGE (OUTPATIENT)
Dept: FAMILY MEDICINE CLINIC | Facility: CLINIC | Age: 64
End: 2024-09-03

## 2024-09-03 NOTE — TELEPHONE ENCOUNTER
Please reply to pool: EM RN TRIAGE  Action Requested: Summary for Provider     []  Critical Lab, Recommendations Needed  [] Need Additional Advice  [x]   FYI    []   Need Orders  [] Need Medications Sent to Pharmacy  []  Other     SUMMARY: Patient contacts clinic reporting pain in her right elbow and arm x 3 weeks.  Feels it most when she moves in a certain way, like putting on her bra.  Pain travels up arm to neck.  Denies chest pain, shortness of breath, numbness, weakness, swelling or tingling.  States symptoms have improved since stopping statin but still present.  Denies dizziness or lightheadedness.  Took tylenol today.  Has not tried heat or ice.  She will do this.  Acute visit booked 09/09. Patient to report any progression of symptoms prior.  She verbalized understanding and compliance.     Reason for call: Arm Pain  Onset: Data Unavailable                       Reason for Disposition   MODERATE pain (e.g., interferes with normal activities) and present > 3 days    Protocols used: Arm Pain-A-OH

## 2024-09-05 NOTE — TELEPHONE ENCOUNTER
See 9/3/2024 triage encounter.   Future Appointments   Date Time Provider Department Center   9/9/2024 11:00 AM Reena Helm MD [facility]   11/14/2024  9:30 AM [facility]

## 2024-09-09 ENCOUNTER — OFFICE VISIT (OUTPATIENT)
Dept: FAMILY MEDICINE CLINIC | Facility: CLINIC | Age: 64
End: 2024-09-09
Payer: COMMERCIAL

## 2024-09-09 VITALS
WEIGHT: 238 LBS | SYSTOLIC BLOOD PRESSURE: 130 MMHG | DIASTOLIC BLOOD PRESSURE: 84 MMHG | HEIGHT: 67 IN | BODY MASS INDEX: 37.35 KG/M2 | HEART RATE: 98 BPM

## 2024-09-09 DIAGNOSIS — M77.11 RIGHT LATERAL EPICONDYLITIS: Primary | ICD-10-CM

## 2024-09-09 PROCEDURE — 3008F BODY MASS INDEX DOCD: CPT | Performed by: FAMILY MEDICINE

## 2024-09-09 PROCEDURE — 99213 OFFICE O/P EST LOW 20 MIN: CPT | Performed by: FAMILY MEDICINE

## 2024-09-09 PROCEDURE — 3075F SYST BP GE 130 - 139MM HG: CPT | Performed by: FAMILY MEDICINE

## 2024-09-09 PROCEDURE — 3079F DIAST BP 80-89 MM HG: CPT | Performed by: FAMILY MEDICINE

## 2024-09-09 NOTE — PROGRESS NOTES
HPI:    Patient ID: Stephanie Cespedes is a 64 year old female.      Arm Pain         Chief Complaint   Patient presents with    Arm Pain     R arm for almost a month started with the elbow        Wt Readings from Last 6 Encounters:   09/09/24 238 lb (108 kg)   06/12/24 234 lb (106.1 kg)   02/16/24 233 lb (105.7 kg)   02/13/24 232 lb (105.2 kg)   11/13/23 242 lb (109.8 kg)   06/07/23 235 lb (106.6 kg)     BP Readings from Last 3 Encounters:   09/09/24 130/84   06/12/24 150/90   02/16/24 139/85     C/o right elbow pain and radiates into right arm and shoulder but that resolved and stays right elbow. Symptoms x 3 weeks.  Hurts with movements and to turn arm to unfasten bra.    Denies any trauma  No tingling.  Feels right arm \"tired in upper arm\"     Patient is right handed  She also writes a lot     Review of Systems   Musculoskeletal:  Positive for arthralgias and myalgias. Negative for back pain, gait problem, joint swelling and neck pain.       /84   Pulse 98   Ht 5' 7\" (1.702 m)   Wt 238 lb (108 kg)   BMI 37.28 kg/m²          Current Outpatient Medications   Medication Sig Dispense Refill    diclofenac (VOLTAREN) 1 % External Gel Apply 2 g topically 4 (four) times daily. 100 g 0    magnesium oxide 400 MG Oral Tab magnesium 400 mg (as magnesium oxide) tablet, [RxNorm: 493550]      hydroCHLOROthiazide 25 MG Oral Tab Take 1 tablet (25 mg total) by mouth daily. 90 tablet 3    valsartan 320 MG Oral Tab Take 1 tablet (320 mg total) by mouth daily. 90 tablet 3    Cyanocobalamin (VITAMIN B 12 OR) 1,000 mcg.      Magnesium 400 MG Oral Tab Take by mouth.      levocetirizine 5 MG Oral Tab Take 1 tablet (5 mg total) by mouth daily. 90 tablet 3    Vitamin D3, Cholecalciferol, 50 MCG (2000 UT) Oral Cap Take 1 capsule (2,000 Units total) by mouth daily.      rosuvastatin (CRESTOR) 10 MG Oral Tab Take 1 tablet (10 mg total) by mouth nightly. (Patient not taking: Reported on 9/9/2024) 90 tablet 3     Allergies:No Known  Allergies   PHYSICAL EXAM:     Chief Complaint   Patient presents with    Arm Pain     R arm for almost a month started with the elbow       Physical Exam  Vitals reviewed.   Musculoskeletal:         General: Tenderness present. No swelling, deformity or signs of injury.      Right elbow: Tenderness present in lateral epicondyle.      Right lower leg: No edema.      Left lower leg: No edema.      Comments: Tender over right lateral epicondyle.  No shoulder tenderness    Neurological:      Mental Status: She is alert.                ASSESSMENT/PLAN:     Encounter Diagnosis   Name Primary?    Right lateral epicondylitis Yes       1. Right lateral epicondylitis  Rest, ice,  elbow brace.  Tylenol as needed  Follow up if no better 2-3 weeks  - diclofenac (VOLTAREN) 1 % External Gel; Apply 2 g topically 4 (four) times daily.  Dispense: 100 g; Refill: 0      No orders of the defined types were placed in this encounter.        The above note was creating using Dragon speech recognition technology. Please excuse any typos    Meds This Visit:  Requested Prescriptions     Signed Prescriptions Disp Refills    diclofenac (VOLTAREN) 1 % External Gel 100 g 0     Sig: Apply 2 g topically 4 (four) times daily.       Imaging & Referrals:  None       ID#[id number]

## 2024-10-02 ENCOUNTER — NURSE TRIAGE (OUTPATIENT)
Dept: FAMILY MEDICINE CLINIC | Facility: CLINIC | Age: 64
End: 2024-10-02

## 2024-10-02 NOTE — TELEPHONE ENCOUNTER
Action Requested: Summary for Provider     []  Critical Lab, Recommendations Needed  [] Need Additional Advice  []   FYI    []   Need Orders  [] Need Medications Sent to Pharmacy  []  Other     SUMMARY: Patient requesting appointment.    Patient complain of intermittent left breast and nipple discomfort as if she bumped into something and made area tender. Pain scale 3. No injury, nipple drainage, reddness or increased warmth. No over the counter medications taken.     Appointment scheduled for 10/7/24 at 11 am for further evaluation by MD. Last mammogram 12/13/23 and normal.    Patient to call nurse back for any abnormal breast changes or drainage. Keep area clean and dry.      Please reply to pool: EM RN TRIAGE      Reason for call: Breast Pain (Patient complain one week left breast and nipple discomfort. )  Onset: Data Unavailable      General Assessment (questions to ask the caller)  Do you consider this a medical emergency?: No  Can you access 911?: Yes  Do you have any pain?:  (Intermittent left breast and nipple discomfort. P.S. 3.)  What is the date of your last medical exam?: 09/09/24 (Pt seen Dr Helm in office.)  Additional Assessments  Associated Symptoms:  (Pt complain of intermittent left breast and nipple discomfort.)  Are these symptoms new, recurrent, or chronic?: new  Precipitated by: no precipitating factors  Aggravated by: no aggravating factors  Alleviated by: nothing alleviates complaint             Reason for Disposition   Breast pain and cause is not known    Protocols used: Breast Symptoms-A-OH

## 2024-10-07 ENCOUNTER — OFFICE VISIT (OUTPATIENT)
Dept: FAMILY MEDICINE CLINIC | Facility: CLINIC | Age: 64
End: 2024-10-07
Payer: COMMERCIAL

## 2024-10-07 VITALS
DIASTOLIC BLOOD PRESSURE: 78 MMHG | SYSTOLIC BLOOD PRESSURE: 151 MMHG | BODY MASS INDEX: 37.7 KG/M2 | HEART RATE: 90 BPM | WEIGHT: 240.19 LBS | HEIGHT: 67 IN

## 2024-10-07 DIAGNOSIS — N64.4 BREAST PAIN: Primary | ICD-10-CM

## 2024-10-07 PROCEDURE — 3077F SYST BP >= 140 MM HG: CPT | Performed by: FAMILY MEDICINE

## 2024-10-07 PROCEDURE — 3008F BODY MASS INDEX DOCD: CPT | Performed by: FAMILY MEDICINE

## 2024-10-07 PROCEDURE — 3078F DIAST BP <80 MM HG: CPT | Performed by: FAMILY MEDICINE

## 2024-10-07 PROCEDURE — 99214 OFFICE O/P EST MOD 30 MIN: CPT | Performed by: FAMILY MEDICINE

## 2024-10-07 NOTE — PROGRESS NOTES
HPI:    Patient ID: Stephanie Cespedes is a 64 year old female.      Breast Pain  Pertinent negatives include no chills or fever.       Chief Complaint   Patient presents with    Breast Pain     Left. Some pain over the past week.        Wt Readings from Last 6 Encounters:   10/07/24 240 lb 3.2 oz (109 kg)   09/09/24 238 lb (108 kg)   06/12/24 234 lb (106.1 kg)   02/16/24 233 lb (105.7 kg)   02/13/24 232 lb (105.2 kg)   11/13/23 242 lb (109.8 kg)     BP Readings from Last 3 Encounters:   10/07/24 151/78   09/09/24 130/84   06/12/24 150/90     Ha shad some sharp pain left breast  1 week ago.  Has not happened since.  No nipple discharge/ swelling.  No trauma    Has had stress test 11/23 and also saw cardiology 4/2024  To return prn    Review of Systems   Constitutional:  Negative for chills and fever.       /78   Pulse 90   Ht 5' 7\" (1.702 m)   Wt 240 lb 3.2 oz (109 kg)   BMI 37.62 kg/m²          Current Outpatient Medications   Medication Sig Dispense Refill    Multiple Vitamins-Minerals (WOMENS 50+ MULTI VITAMIN/MIN) Oral Tab Take by mouth.      diclofenac (VOLTAREN) 1 % External Gel Apply 2 g topically 4 (four) times daily. 100 g 0    magnesium oxide 400 MG Oral Tab magnesium 400 mg (as magnesium oxide) tablet, [RxNorm: 892255]      hydroCHLOROthiazide 25 MG Oral Tab Take 1 tablet (25 mg total) by mouth daily. 90 tablet 3    valsartan 320 MG Oral Tab Take 1 tablet (320 mg total) by mouth daily. 90 tablet 3    Cyanocobalamin (VITAMIN B 12 OR) 1,000 mcg.      levocetirizine 5 MG Oral Tab Take 1 tablet (5 mg total) by mouth daily. 90 tablet 3    Vitamin D3, Cholecalciferol, 50 MCG (2000 UT) Oral Cap Take 1 capsule (2,000 Units total) by mouth daily.       Allergies:  Allergies   Allergen Reactions    Mushrooms HIVES and NAUSEA ONLY      PHYSICAL EXAM:     Chief Complaint   Patient presents with    Breast Pain     Left. Some pain over the past week.       Physical Exam  Vitals reviewed.   Chest:   Breasts:      Ronald Score is 5.      Right: Tenderness present.      Left: Tenderness present.      Comments: Large breasted.   Left breast - tenderness 8 oclock  Right breast - tenderness 4 oclock   No nipple discharge   Lymphadenopathy:      Upper Body:      Right upper body: No supraclavicular, axillary or pectoral adenopathy.      Left upper body: No supraclavicular, axillary or pectoral adenopathy.   Neurological:      Mental Status: She is alert.                ASSESSMENT/PLAN:     Encounter Diagnosis   Name Primary?    Breast pain Yes       1. Breast pain  Suspect musculoskeletal/ ? Breast contusion as sleeps on her stomach.  Diag mammo  If persists may need to see breast surgeon   - [facility] MICHAEL 2D+3D DIAGNOSTIC [facility]  [facility]IL[facility] (CPT=[id number]); Future      No orders of the defined types were placed in this encounter.        The above note was creating using Dragon speech recognition technology. Please excuse any typos    Meds This Visit:  Requested Prescriptions      No prescriptions requested or ordered in this encounter       Imaging & Referrals:  [facility] MICHAEL 2D+3D DIAGNOSTIC [facility]  [facility] (RZZ=[id number])       ID#[id number]

## 2024-10-22 ENCOUNTER — HOSPITAL ENCOUNTER (OUTPATIENT)
Dept: ULTRASOUND IMAGING | Facility: HOSPITAL | Age: 64
Discharge: HOME | End: 2024-10-22
Attending: FAMILY MEDICINE
Payer: COMMERCIAL

## 2024-10-22 ENCOUNTER — HOSPITAL ENCOUNTER (OUTPATIENT)
Dept: MAMMOGRAPHY | Facility: HOSPITAL | Age: 64
Discharge: HOME | End: 2024-10-22
Attending: FAMILY MEDICINE
Payer: COMMERCIAL

## 2024-10-22 DIAGNOSIS — N64.4 BREAST PAIN: ICD-10-CM

## 2024-10-22 PROCEDURE — 76642 ULTRASOUND BREAST LIMITED: CPT | Performed by: FAMILY MEDICINE

## 2024-10-22 PROCEDURE — 77062 BREAST TOMOSYNTHESIS BI: CPT | Performed by: FAMILY MEDICINE

## 2024-10-22 PROCEDURE — 77066 DX MAMMO INCL CAD BI: CPT | Performed by: FAMILY MEDICINE

## 2024-11-18 ENCOUNTER — LAB ENCOUNTER (OUTPATIENT)
Dept: LAB | Age: 64
End: 2024-11-18
Attending: FAMILY MEDICINE
Payer: COMMERCIAL

## 2024-11-18 ENCOUNTER — OFFICE VISIT (OUTPATIENT)
Dept: FAMILY MEDICINE CLINIC | Facility: CLINIC | Age: 64
End: 2024-11-18
Payer: COMMERCIAL

## 2024-11-18 VITALS
DIASTOLIC BLOOD PRESSURE: 85 MMHG | BODY MASS INDEX: 37.67 KG/M2 | WEIGHT: 240 LBS | SYSTOLIC BLOOD PRESSURE: 142 MMHG | HEART RATE: 88 BPM | HEIGHT: 67 IN

## 2024-11-18 DIAGNOSIS — Z00.00 WELL ADULT EXAM: ICD-10-CM

## 2024-11-18 DIAGNOSIS — R73.03 PREDIABETES: ICD-10-CM

## 2024-11-18 DIAGNOSIS — E55.9 VITAMIN D DEFICIENCY: ICD-10-CM

## 2024-11-18 DIAGNOSIS — Z82.49 FH: CAD (CORONARY ARTERY DISEASE): ICD-10-CM

## 2024-11-18 DIAGNOSIS — R91.1 RIGHT LOWER LOBE PULMONARY NODULE: ICD-10-CM

## 2024-11-18 DIAGNOSIS — Z00.00 WELL ADULT EXAM: Primary | ICD-10-CM

## 2024-11-18 DIAGNOSIS — M17.11 PRIMARY OSTEOARTHRITIS OF RIGHT KNEE: ICD-10-CM

## 2024-11-18 DIAGNOSIS — I10 ESSENTIAL HYPERTENSION: ICD-10-CM

## 2024-11-18 DIAGNOSIS — F43.23 ADJUSTMENT DISORDER WITH MIXED ANXIETY AND DEPRESSED MOOD: ICD-10-CM

## 2024-11-18 DIAGNOSIS — E78.00 HYPERCHOLESTEREMIA: ICD-10-CM

## 2024-11-18 DIAGNOSIS — E66.9 OBESITY (BMI 30-39.9): ICD-10-CM

## 2024-11-18 LAB
ALBUMIN SERPL-MCNC: 4.5 G/DL (ref 3.2–4.8)
ALBUMIN/GLOB SERPL: 1.5 {RATIO} (ref 1–2)
ALP LIVER SERPL-CCNC: 102 U/L
ALT SERPL-CCNC: 23 U/L
ANION GAP SERPL CALC-SCNC: 5 MMOL/L (ref 0–18)
AST SERPL-CCNC: 26 U/L (ref ?–34)
BASOPHILS # BLD AUTO: 0.04 X10(3) UL (ref 0–0.2)
BASOPHILS NFR BLD AUTO: 0.7 %
BILIRUB SERPL-MCNC: 0.7 MG/DL (ref 0.2–1.1)
BUN BLD-MCNC: 13 MG/DL (ref 9–23)
BUN/CREAT SERPL: 13.5 (ref 10–20)
CALCIUM BLD-MCNC: 10.5 MG/DL (ref 8.7–10.4)
CHLORIDE SERPL-SCNC: 104 MMOL/L (ref 98–112)
CHOLEST SERPL-MCNC: 245 MG/DL (ref ?–200)
CO2 SERPL-SCNC: 31 MMOL/L (ref 21–32)
CREAT BLD-MCNC: 0.96 MG/DL
DEPRECATED RDW RBC AUTO: 40.3 FL (ref 35.1–46.3)
EGFRCR SERPLBLD CKD-EPI 2021: 66 ML/MIN/1.73M2 (ref 60–?)
EOSINOPHIL # BLD AUTO: 0.13 X10(3) UL (ref 0–0.7)
EOSINOPHIL NFR BLD AUTO: 2.4 %
ERYTHROCYTE [DISTWIDTH] IN BLOOD BY AUTOMATED COUNT: 13.2 % (ref 11–15)
EST. AVERAGE GLUCOSE BLD GHB EST-MCNC: 128 MG/DL (ref 68–126)
FASTING PATIENT LIPID ANSWER: YES
FASTING STATUS PATIENT QL REPORTED: YES
GLOBULIN PLAS-MCNC: 3.1 G/DL (ref 2–3.5)
GLUCOSE BLD-MCNC: 84 MG/DL (ref 70–99)
HBA1C MFR BLD: 6.1 % (ref ?–5.7)
HCT VFR BLD AUTO: 47.7 %
HDLC SERPL-MCNC: 77 MG/DL (ref 40–59)
HGB BLD-MCNC: 15 G/DL
IMM GRANULOCYTES # BLD AUTO: 0.01 X10(3) UL (ref 0–1)
IMM GRANULOCYTES NFR BLD: 0.2 %
LDLC SERPL CALC-MCNC: 156 MG/DL (ref ?–100)
LYMPHOCYTES # BLD AUTO: 1.84 X10(3) UL (ref 1–4)
LYMPHOCYTES NFR BLD AUTO: 33.5 %
MCH RBC QN AUTO: 26.4 PG (ref 26–34)
MCHC RBC AUTO-ENTMCNC: 31.4 G/DL (ref 31–37)
MCV RBC AUTO: 84 FL
MONOCYTES # BLD AUTO: 0.48 X10(3) UL (ref 0.1–1)
MONOCYTES NFR BLD AUTO: 8.7 %
NEUTROPHILS # BLD AUTO: 3 X10 (3) UL (ref 1.5–7.7)
NEUTROPHILS # BLD AUTO: 3 X10(3) UL (ref 1.5–7.7)
NEUTROPHILS NFR BLD AUTO: 54.5 %
NONHDLC SERPL-MCNC: 168 MG/DL (ref ?–130)
OSMOLALITY SERPL CALC.SUM OF ELEC: 289 MOSM/KG (ref 275–295)
PLATELET # BLD AUTO: 241 10(3)UL (ref 150–450)
POTASSIUM SERPL-SCNC: 4.1 MMOL/L (ref 3.5–5.1)
PROT SERPL-MCNC: 7.6 G/DL (ref 5.7–8.2)
RBC # BLD AUTO: 5.68 X10(6)UL
SODIUM SERPL-SCNC: 140 MMOL/L (ref 136–145)
T4 FREE SERPL-MCNC: 1.1 NG/DL (ref 0.8–1.7)
TRIGL SERPL-MCNC: 71 MG/DL (ref 30–149)
TSI SER-ACNC: 5.16 UIU/ML (ref 0.55–4.78)
VLDLC SERPL CALC-MCNC: 14 MG/DL (ref 0–30)
WBC # BLD AUTO: 5.5 X10(3) UL (ref 4–11)

## 2024-11-18 PROCEDURE — 80061 LIPID PANEL: CPT

## 2024-11-18 PROCEDURE — 83036 HEMOGLOBIN GLYCOSYLATED A1C: CPT

## 2024-11-18 PROCEDURE — 80053 COMPREHEN METABOLIC PANEL: CPT

## 2024-11-18 PROCEDURE — 84439 ASSAY OF FREE THYROXINE: CPT

## 2024-11-18 PROCEDURE — 84443 ASSAY THYROID STIM HORMONE: CPT

## 2024-11-18 PROCEDURE — 85025 COMPLETE CBC W/AUTO DIFF WBC: CPT

## 2024-11-18 PROCEDURE — 36415 COLL VENOUS BLD VENIPUNCTURE: CPT

## 2024-11-18 RX ORDER — HYDROCHLOROTHIAZIDE 25 MG/1
25 TABLET ORAL DAILY
Qty: 90 TABLET | Refills: 3 | Status: SHIPPED | OUTPATIENT
Start: 2024-11-18

## 2024-11-18 RX ORDER — VALSARTAN 320 MG/1
320 TABLET ORAL DAILY
Qty: 90 TABLET | Refills: 3 | Status: SHIPPED | OUTPATIENT
Start: 2024-11-18

## 2024-11-18 RX ORDER — PRAVASTATIN SODIUM 20 MG
20 TABLET ORAL NIGHTLY
Qty: 30 TABLET | Refills: 0 | Status: SHIPPED | OUTPATIENT
Start: 2024-11-18

## 2024-11-18 NOTE — PROGRESS NOTES
HPI:    Patient ID: Stephanie Cespedes is a 64 year old female.    HPI  Chief Complaint   Patient presents with    Well Adult     Sees gyne        Wt Readings from Last 6 Encounters:   11/18/24 240 lb (108.9 kg)   10/07/24 240 lb 3.2 oz (109 kg)   09/09/24 238 lb (108 kg)   06/12/24 234 lb (106.1 kg)   02/16/24 233 lb (105.7 kg)   02/13/24 232 lb (105.2 kg)     BP Readings from Last 3 Encounters:   11/18/24 142/85   10/07/24 151/78   09/09/24 130/84     Didn't take blood pressure meds this am.  Sees gyne    Didn't tolerate crestor due to muscle pain    Review of Systems   Constitutional:  Negative for activity change, appetite change, chills, fatigue, fever and unexpected weight change.   HENT:  Negative for congestion, dental problem, drooling, ear discharge, ear pain, facial swelling, hearing loss, mouth sores, nosebleeds, postnasal drip, rhinorrhea, sinus pressure, sinus pain, sneezing, sore throat, tinnitus, trouble swallowing and voice change.    Eyes:  Negative for pain, discharge, redness and visual disturbance.   Respiratory:  Negative for cough, shortness of breath and wheezing.    Cardiovascular:  Negative for chest pain, palpitations and leg swelling.   Gastrointestinal:  Negative for abdominal pain, anal bleeding, blood in stool, constipation, diarrhea, nausea, rectal pain and vomiting.   Endocrine: Negative for cold intolerance, heat intolerance, polydipsia, polyphagia and polyuria.   Genitourinary:  Negative for decreased urine volume, difficulty urinating, dysuria, flank pain, frequency, menstrual problem, pelvic pain, urgency, vaginal bleeding, vaginal discharge and vaginal pain.        Is menopausal     Musculoskeletal:  Negative for arthralgias, back pain and myalgias.   Skin:  Negative for rash.   Neurological:  Negative for dizziness, seizures, syncope, weakness, numbness and headaches.   Hematological:  Does not bruise/bleed easily.   Psychiatric/Behavioral:  Negative for behavioral problems,  decreased concentration, self-injury, sleep disturbance and suicidal ideas. The patient is not nervous/anxious.        /85   Pulse 88   Ht 5' 7\" (1.702 m)   Wt 240 lb (108.9 kg)   BMI 37.59 kg/m²     Past Medical History:    Depression    willing to discuss    Obesity    Osteoarthritis    Other and unspecified hyperlipidemia    Primary hypothyroidism    Unspecified essential hypertension     Past Surgical History:   Procedure Laterality Date          2    Colonoscopy  2014    Electrocardiogram, complete  2013    scanned to media tab, 2013     Social History     Socioeconomic History    Marital status:      Spouse name: Not on file    Number of children: Not on file    Years of education: Not on file    Highest education level: Not on file   Occupational History    Not on file   Tobacco Use    Smoking status: Never    Smokeless tobacco: Never   Vaping Use    Vaping status: Never Used   Substance and Sexual Activity    Alcohol use: Yes     Alcohol/week: 1.0 - 2.0 standard drink of alcohol     Types: 1 - 2 Glasses of wine per week     Comment: wine, rarely    Drug use: No    Sexual activity: Not on file     Comment: menopause   Other Topics Concern     Service Not Asked    Blood Transfusions Not Asked    Caffeine Concern Yes     Comment: coffee    Occupational Exposure Not Asked    Hobby Hazards Not Asked    Sleep Concern Not Asked    Stress Concern Not Asked    Weight Concern Not Asked    Special Diet Not Asked    Back Care Not Asked    Exercise Not Asked    Bike Helmet Not Asked    Seat Belt Not Asked    Self-Exams Not Asked    Grew up on a farm Not Asked    History of tanning Not Asked    Outdoor occupation Not Asked    Pt has a pacemaker No    Pt has a defibrillator No    Breast feeding Not Asked    Reaction to local anesthetic No   Social History Narrative    Not on file     Social Drivers of Health     Financial Resource Strain: Not on file   Food Insecurity:  Not on file   Transportation Needs: Not on file   Physical Activity: Not on file   Stress: Not on file   Social Connections: Not on file   Housing Stability: Not on file     Family History   Problem Relation Age of Onset    Hypertension Father     Cancer Father         intestinal cancer    Heart Disorder Father     Hypertension Mother     Diabetes Mother     Cancer Mother 75        : Stage 4 Pancreatic Cancer    Other (pancreatic canc) Mother     Hypertension Other     Heart Disease Other     Heart Disorder Maternal Grandmother 76        : Congestive Heart Failure    Asthma Paternal Grandmother             Stroke Maternal Grandfather         ; was also a dialysis patient    Heart Disorder Paternal Grandfather         : heart attack    Heart Disorder Brother         :  COPD       Immunization History   Administered Date(s) Administered    Covid-19 Vaccine Moderna 100 mcg/0.5 ml 2021, 2021    Covid-19 Vaccine Moderna 50 Mcg/0.25 Ml 2021    Covid-19 Vaccine Moderna Bivalent 50mcg/0.5mL 12+ years 10/05/2022    FLULAVAL 6 months & older 0.5 ml Prefilled syringe (87669) 10/22/2020, 2022    FLUZONE 6 months and older PFS 0.5 ml (38454) 10/22/2020    Pfizer Covid-19 Vaccine 30mcg/0.3ml 12yrs+ 11/10/2023    TDAP 10/24/2013, 04/15/2024       Health Maintenance   Topic Date Due    Pneumococcal Vaccine: Birth to 64yrs (1 of 2 - PCV) Never done    Zoster Vaccines (1 of 2) Never done    Pap Smear  2023    COVID-19 Vaccine (6 - - season) 2024    Influenza Vaccine (1) 10/01/2024    HTN: BP Follow-Up  2024    Annual Physical  2024    Mammogram  10/22/2025    Colorectal Cancer Screening  2026    DTaP,Tdap,and Td Vaccines (3 - Td or Tdap) 04/15/2034    Annual Depression Screening  Completed          Current Outpatient Medications   Medication Sig Dispense Refill    pravastatin 20 MG Oral Tab Take 1 tablet (20 mg total) by mouth  nightly. 30 tablet 0    hydroCHLOROthiazide 25 MG Oral Tab Take 1 tablet (25 mg total) by mouth daily. 90 tablet 3    valsartan 320 MG Oral Tab Take 1 tablet (320 mg total) by mouth daily. 90 tablet 3    Multiple Vitamins-Minerals (WOMENS 50+ MULTI VITAMIN/MIN) Oral Tab Take by mouth.      magnesium oxide 400 MG Oral Tab magnesium 400 mg (as magnesium oxide) tablet, [RxNorm: 443395]      levocetirizine 5 MG Oral Tab Take 1 tablet (5 mg total) by mouth daily. 90 tablet 3    Vitamin D3, Cholecalciferol, 50 MCG (2000 UT) Oral Cap Take 1 capsule (2,000 Units total) by mouth daily.       Allergies:Allergies[1]   PHYSICAL EXAM:     Chief Complaint   Patient presents with    Well Adult     Sees gyne       Physical Exam  Vitals and nursing note reviewed.   Constitutional:       Appearance: She is well-developed.   HENT:      Head: Normocephalic and atraumatic.      Right Ear: External ear normal.      Left Ear: External ear normal.      Nose: Nose normal.      Mouth/Throat:      Pharynx: No oropharyngeal exudate.   Eyes:      General:         Right eye: No discharge.         Left eye: No discharge.      Conjunctiva/sclera: Conjunctivae normal.      Pupils: Pupils are equal, round, and reactive to light.   Neck:      Thyroid: No thyromegaly.   Cardiovascular:      Rate and Rhythm: Normal rate and regular rhythm.      Heart sounds: Normal heart sounds. No murmur heard.  Pulmonary:      Effort: Pulmonary effort is normal.      Breath sounds: Normal breath sounds. No wheezing.   Abdominal:      General: Bowel sounds are normal.      Palpations: Abdomen is soft. There is no mass.      Tenderness: There is no abdominal tenderness.   Musculoskeletal:         General: No tenderness.      Cervical back: Normal range of motion and neck supple.   Lymphadenopathy:      Cervical: No cervical adenopathy.   Skin:     General: Skin is dry.      Findings: No rash.   Neurological:      Mental Status: She is alert and oriented to person,  place, and time.      Cranial Nerves: No cranial nerve deficit.      Motor: No abnormal muscle tone.      Coordination: Coordination normal.      Deep Tendon Reflexes: Reflexes are normal and symmetric. Reflexes normal.   Psychiatric:         Behavior: Behavior normal.         Thought Content: Thought content normal.         Judgment: Judgment normal.                ASSESSMENT/PLAN:     Return yearly for physicals  Follow up with dentist every 6 months  Follow up with eye doctor yearly  Recommend aerobic exercise for at least 30mins 5 days a week  Yearly flu shot  Tetanus booster every 10 years (Tdap/ Td)  Labs ordered/ or reviewed if done prior to appointment     Encounter Diagnoses   Name Primary?    Well adult exam Yes    Essential hypertension     Hypercholesteremia     Obesity (BMI 30-39.9)     Vitamin D deficiency     Primary osteoarthritis of right knee     FH: CAD (coronary artery disease)     Adjustment disorder with mixed anxiety and depressed mood     Prediabetes     Right lower lobe pulmonary nodule        1. Well adult exam    - CBC With Differential With Platelet; Future  - Comp Metabolic Panel (14); Future  - TSH W Reflex To Free T4; Future  - Lipid Panel; Future  - Hemoglobin A1C; Future    2. Essential hypertension  Stable condition  Reviewed medications  Continue current medication management   All questions answered to the best of my ability.    - hydroCHLOROthiazide 25 MG Oral Tab; Take 1 tablet (25 mg total) by mouth daily.  Dispense: 90 tablet; Refill: 3  - valsartan 320 MG Oral Tab; Take 1 tablet (320 mg total) by mouth daily.  Dispense: 90 tablet; Refill: 3    3. Hypercholesteremia  Stable condition  Reviewed medications  Continue current medication management   All questions answered to the best of my ability.    - pravastatin 20 MG Oral Tab; Take 1 tablet (20 mg total) by mouth nightly.  Dispense: 30 tablet; Refill: 0    4. Obesity (BMI 30-39.9)  Wt Readings from Last 6 Encounters:    11/18/24 240 lb (108.9 kg)   10/07/24 240 lb 3.2 oz (109 kg)   09/09/24 238 lb (108 kg)   06/12/24 234 lb (106.1 kg)   02/16/24 233 lb (105.7 kg)   02/13/24 232 lb (105.2 kg)       Highly recommend to lose weight.  Discussed good dietary and eating habits as well as increasing vegetable and fruit intake.  Recommending avoiding foods high in fat content.  Recommend exercising at least 30-40 minutes 5-6 days a week.  Avoid skipping meals.  Making healthy choices for snacks and also limiting sugary beverages.      5. Vitamin D deficiency  replaced    6. Primary osteoarthritis of right knee  Tylenol as needed     7. FH: CAD (coronary artery disease)      8. Adjustment disorder with mixed anxiety and depressed mood  stable    9. Prediabetes  Continue to work on weight loss    10. Right lower lobe pulmonary nodule  stable      Orders Placed This Encounter   Procedures    CBC With Differential With Platelet    Comp Metabolic Panel (14)    TSH W Reflex To Free T4    Lipid Panel    Hemoglobin A1C       The above note was creating using Dragon speech recognition technology. Please excuse any typos    Meds This Visit:  Requested Prescriptions     Signed Prescriptions Disp Refills    pravastatin 20 MG Oral Tab 30 tablet 0     Sig: Take 1 tablet (20 mg total) by mouth nightly.    hydroCHLOROthiazide 25 MG Oral Tab 90 tablet 3     Sig: Take 1 tablet (25 mg total) by mouth daily.    valsartan 320 MG Oral Tab 90 tablet 3     Sig: Take 1 tablet (320 mg total) by mouth daily.       Imaging & Referrals:  None       ID#[id number]       [1]   Allergies  Allergen Reactions    Mushrooms HIVES and NAUSEA ONLY    Crestor [Rosuvastatin] MYALGIA

## 2024-12-11 DIAGNOSIS — E78.00 HYPERCHOLESTEREMIA: ICD-10-CM

## 2024-12-11 RX ORDER — PRAVASTATIN SODIUM 20 MG
20 TABLET ORAL NIGHTLY
Qty: 30 TABLET | Refills: 0 | Status: CANCELLED | OUTPATIENT
Start: 2024-12-11

## 2024-12-11 NOTE — TELEPHONE ENCOUNTER
Stephanie Cespedes to P Em Rn Triage (supporting Reena Helm MD)         12/4/24  3:48 PM  Also, I began  taking the Pravastatin on 11/19. I have missed a couple doses since starting it, however, I am back on track now.   Having neck, shoulder and leg pain again  but I guess it's a side effect of this medication that I will have to tolerate. I'm taking Tylenol as needed but I worry about doing that too much, too.      And, prior to starting this new this medication, I have been having more pain in my left hip/thigh, sometimes feeling like my leg wants to come out of its socket.       Stephanie

## 2025-01-15 ENCOUNTER — LAB ENCOUNTER (OUTPATIENT)
Dept: LAB | Age: 65
End: 2025-01-15
Attending: FAMILY MEDICINE
Payer: COMMERCIAL

## 2025-01-15 DIAGNOSIS — R79.89 ELEVATED TSH: ICD-10-CM

## 2025-01-15 LAB — TSI SER-ACNC: 3.61 UIU/ML (ref 0.55–4.78)

## 2025-01-15 PROCEDURE — 36415 COLL VENOUS BLD VENIPUNCTURE: CPT

## 2025-01-15 PROCEDURE — 84443 ASSAY THYROID STIM HORMONE: CPT

## 2025-01-21 ENCOUNTER — IMMUNIZATION (OUTPATIENT)
Dept: LAB | Age: 65
End: 2025-01-21
Attending: EMERGENCY MEDICINE
Payer: COMMERCIAL

## 2025-01-21 DIAGNOSIS — Z23 NEED FOR VACCINATION: Primary | ICD-10-CM

## 2025-01-21 PROCEDURE — 90471 IMMUNIZATION ADMIN: CPT

## 2025-01-21 PROCEDURE — 90480 ADMN SARSCOV2 VAC 1/ONLY CMP: CPT

## 2025-01-21 PROCEDURE — 90656 IIV3 VACC NO PRSV 0.5 ML IM: CPT

## 2025-04-10 ENCOUNTER — OFFICE VISIT (OUTPATIENT)
Dept: OBGYN CLINIC | Facility: CLINIC | Age: 65
End: 2025-04-10
Payer: COMMERCIAL

## 2025-04-10 VITALS
WEIGHT: 255.63 LBS | SYSTOLIC BLOOD PRESSURE: 167 MMHG | DIASTOLIC BLOOD PRESSURE: 96 MMHG | BODY MASS INDEX: 40 KG/M2 | HEART RATE: 103 BPM

## 2025-04-10 DIAGNOSIS — N89.8 VAGINAL ODOR: ICD-10-CM

## 2025-04-10 DIAGNOSIS — Z12.31 VISIT FOR SCREENING MAMMOGRAM: ICD-10-CM

## 2025-04-10 DIAGNOSIS — R32 URINARY INCONTINENCE, UNSPECIFIED TYPE: ICD-10-CM

## 2025-04-10 DIAGNOSIS — Z01.419 ENCOUNTER FOR GYNECOLOGICAL EXAMINATION WITHOUT ABNORMAL FINDING: Primary | ICD-10-CM

## 2025-04-10 DIAGNOSIS — R68.82 DECREASED LIBIDO: ICD-10-CM

## 2025-04-10 DIAGNOSIS — Z12.4 SCREENING FOR CERVICAL CANCER: ICD-10-CM

## 2025-04-10 DIAGNOSIS — I10 ELEVATED BLOOD PRESSURE READING IN OFFICE WITH DIAGNOSIS OF HYPERTENSION: ICD-10-CM

## 2025-04-10 PROCEDURE — 3080F DIAST BP >= 90 MM HG: CPT | Performed by: OBSTETRICS & GYNECOLOGY

## 2025-04-10 PROCEDURE — 99212 OFFICE O/P EST SF 10 MIN: CPT | Performed by: OBSTETRICS & GYNECOLOGY

## 2025-04-10 PROCEDURE — 99396 PREV VISIT EST AGE 40-64: CPT | Performed by: OBSTETRICS & GYNECOLOGY

## 2025-04-10 PROCEDURE — 3077F SYST BP >= 140 MM HG: CPT | Performed by: OBSTETRICS & GYNECOLOGY

## 2025-04-10 NOTE — PROGRESS NOTES
The following individual(s) verbally consented to be recorded using ambient AI listening technology and understand that they can each withdraw their consent to this listening technology at any point by asking the clinician to turn off or pause the recording:    Patient name: Stephanie BUCK Cespedes  Additional names:

## 2025-04-10 NOTE — TELEPHONE ENCOUNTER
Refill passed per [facility] protocol. Requested Prescriptions   Pending Prescriptions Disp Refills    VALSARTAN 320 MG Oral Tab [Pharmacy Med Name: VALSARTAN 320 MG TABLET] 90 tablet 2     Sig: TAKE 1 TABLET BY MOUTH ONCE DAILY.        Hypertensive Medications Protocol Passed - 11/30/2023 12:40 AM        Passed - In person appointment in the past 12 or next 3 months     Recent Outpatient Visits              2 weeks ago Well adult exam    Marisela Allen MD    Office Visit    5 months ago Postmenopausal bleeding    Jaida Grant, [address] [facility] Kalee Gerardo MD    Office Visit    6 months ago Postmenopausal bleeding    Jaida Grant, [address] [facility] Kalee Gerardo MD    Office Visit    6 months ago Primary osteoarthritis of right knee    Jaida Grant, [address] [facility],[address], Aimee Barton MD    Office Visit    7 months ago Acute pain of right knee    Jennifer Hernandez, Karel Johnson MD    Office Visit          Future Appointments         Provider Department Appt Notes    In 1 week ADO DEXA RM1; ADO ANETTE [id number] [facility] - [facility] Order in epic    In 2 months MD Jaida Clarke [place] 3 month follow up    In 2 months [address] [facility], [address] [address], DO Jaida Grant, [address] [facility], [facility] [place] - Right lower lobe pulmonary nodule   (Policy informed)               Passed - Last BP reading less than 140/90     BP Readings from Last 1 Encounters:   11/13/23 134/85               Passed - CMP or BMP in past 6 months     Recent Results (from the past 4392 hour(s))   Comp Metabolic Panel (14)    Collection Time: 11/08/23  9:59 AM   Result Value Ref Range    Glucose 87 70 - 99 mg/dL    Sodium 138 136 - 145 mmol/L    Potassium 4.2 3.5 - 5.1 mmol/L    Chloride 104 98 - 112 mmol/L CO2 31.0 21.0 - 32.0 mmol/L    Anion Gap 3 0 - 18 mmol/L    BUN 13 9 - 23 mg/dL    Creatinine 0.95 0.55 - 1.02 mg/dL    BUN/CREA Ratio 13.7 10.0 - 20.0    Calcium, Total 10.0 8.7 - 10.4 mg/dL    Calculated Osmolality 285 275 - 295 mOsm/kg    eGFR-Cr 68 >=60 mL/min/1.73m2    ALT 23 10 - 49 U/L    AST 30 <=34 U/L    Alkaline Phosphatase 114 50 - 130 U/L    Bilirubin, Total 0.7 0.2 - 1.1 mg/dL    Total Protein 7.6 5.7 - 8.2 g/dL    Albumin 4.6 3.2 - 4.8 g/dL    Globulin  3.0 2.8 - 4.4 g/dL    A/G Ratio 1.5 1.0 - 2.0    Patient Fasting for CMP? Yes      *Note: Due to a large number of results and/or encounters for the requested time period, some results have not been displayed. A complete set of results can be found in Results Review.                Passed - In person appointment or virtual visit in the past 6 months     Recent Outpatient Visits              2 weeks ago Well adult exam    Kim Ricks MD    Office Visit    5 months ago Postmenopausal bleeding    [address], [address] [facility],[address] [place] [address] - OB/GYN Jair Espinoza MD    Office Visit    6 months ago Postmenopausal bleeding    [address], [address] [facility],[address] [place] [address] - OB/GYN Jair Espinoza MD    Office Visit    6 months ago Primary osteoarthritis of right knee    [address], [address] [facility],[address] [facility], Anmol De Paz MD    Office Visit    7 months ago Acute pain of right knee    Brooke Navarro MD    Office Visit          Future Appointments         Provider Department Appt Notes    In 1 week ADO DEXA RM1; ADO ANETTE [id number] [facility] - [facility] Order in epic    In 2 months Sharron Perry MD [address], [facility] 3 month follow up    In 2 months Hardeep Lira, [address] [address] DO [address] [facility] - Right lower lobe pulmonary Surgery Progress Note    S: Patient seen and examined. No acute events overnight.    O:  Physical Exam:  Gen: Laying in bed, sleeping comfortably  Resp: Unlabored breathing, on room air    Vital Signs Last 24 Hrs  T(C): 36.7 (10 Apr 2025 00:27), Max: 36.7 (10 Apr 2025 00:27)  T(F): 98.1 (10 Apr 2025 00:27), Max: 98.1 (10 Apr 2025 00:27)  HR: 80 (10 Apr 2025 00:27) (80 - 80)  BP: 114/70 (10 Apr 2025 00:27) (114/70 - 120/68)  BP(mean): --  RR: 18 (10 Apr 2025 00:27) (18 - 18)  SpO2: 94% (10 Apr 2025 00:27) (94% - 95%)    Parameters below as of 10 Apr 2025 00:27  Patient On (Oxygen Delivery Method): room air        I&O's Detail    09 Apr 2025 07:01  -  10 Apr 2025 07:00  --------------------------------------------------------  IN:  Total IN: 0 mL    OUT:    Indwelling Catheter - Suprapubic (mL): 200 mL    Other (mL): 0 mL  Total OUT: 200 mL    Total NET: -200 mL              04-09    134[L]  |  96  |  43[H]  ----------------------------<  56[L]  4.5   |  22  |  3.72[H]    Ca    7.2[L]      09 Apr 2025 09:41         nodule   (Policy informed)               Passed - EGFRCR or GFRAA > 50     GFR Evaluation  EGFRCR: 68 , resulted on 11/8/2023             Future Appointments         Provider Department Appt Notes    In 1 week ADO DEXA RM1; ADO ANETTE [address] [facility], [place] [facility] [place] [facility]    In 2 months MD Dilshad [facility] 3 month follow up    In 2 months Dahiana Leyden, [address] [address], DO [facility] - Right lower lobe pulmonary nodule   (Policy informed)          Recent Outpatient Visits              2 weeks ago Well adult exam    Gerald Bowen MD    Office Visit    5 months ago Postmenopausal bleeding    Sherri Graham, [address] [facility] [address], [facility] - OB/GYN Yamini Hernandez MD    Office Visit    6 months ago Postmenopausal bleeding    Sherri Graham, [address] [facility] Yamini Hernandez MD    Office Visit    6 months ago Primary osteoarthritis of right knee    [address] [facility], Elmer Reyes MD    Office Visit    7 months ago Acute pain of right knee    Rl Manges, Agustín Drew MD    Office Visit

## 2025-04-10 NOTE — PROGRESS NOTES
Stephanie Cespedes is a 64 year old female  Patient's last menstrual period was 2014.    Chief Complaint   Patient presents with    Gyn Exam     Annual // Vaginal odor    .       History of Present Illness  Stephanie Cespedes is a 64 year old female who presents with concerns about vaginal odor and bladder leakage.    She experiences a vaginal odor, which she describes as an 'old lady odor.' She is unsure if this is related to a discharge but notes that her mother, grandmother, and aunties have mentioned similar experiences. We discussed that the odor could be associated the odor with bladder leakage, which she notices occasionally when sneezing. She also experiences an urge to urinate and has to get to the bathroom immediately.    She mentions a lack of interest in sexual activity, which she attributes to stress and her current life circumstances. Her  is patient, but she is concerned about the impact on her relationship. She is currently seeing a therapist for grief, which she finds helpful.    She has a history of elevated blood pressure readings during medical appointments, which she attributes to anxiety about doctor visits. At home, her blood pressure readings are not as high. She has not had any recent medication adjustments and does not have a scheduled follow-up with her primary care provider.    She experiences lower back pain, which she attributes to her weight. She wants to lose approximately 100 pounds to feel better. Her primary care provider has been concerned about her weight, which she acknowledges as a contributing factor to her health issues.    She has been dealing with stress and grief, having lost her mother six years ago and her brother last year to COPD. She was caring for her father in a rehab facility at the time of her brother's passing, which added to her stress. She reports experiencing palpitations during stressful periods, particularly while caring for her father. She  underwent cardiac monitoring, which did not reveal any abnormalities. She attributes the palpitations to stress and anxiety.       OBSTETRICS HISTORY:  OB History    Para Term  AB Living   3 2 0 2 1 2   SAB IAB Ectopic Multiple Live Births   0 0 0 0 2       GYNE HISTORY:   Pap Date: 20  Pap Result Notes: Neg Pap/HPV // Mammo 10/22/24 Diag C2-Benign  Follow Up Recommendation: Dexa 22 Normal // Annual 20 CAP      MEDICAL HISTORY:  Past Medical History[1]  Past Surgical History[2]      SOCIAL HISTORY:  Social Hx on file[3]     FAMILY HISTORY:  Family History[4]    MEDICATIONS:  Medications - Current[5]    ALLERGIES:  Allergies[6]    Blood pressure (!) 167/96, pulse 103, weight 255 lb 9.6 oz (115.9 kg), last menstrual period 2014, not currently breastfeeding.    Review of Systems:  Constitutional:  Denies fatigue, night sweats, hot flashes  Eyes:  denies blurred or double vision  Cardiovascular:  denies chest pain or palpitations  Respiratory:  denies shortness of breath  Gastrointestinal:  denies heartburn, abdominal pain, diarrhea or constipation  Genitourinary:  denies dysuria, incontinence, abnormal vaginal discharge, vaginal itching  Musculoskeletal:  denies back pain.  Skin/Breast:  Denies any breast pain, lumps, or discharge.   Neurological:  denies headaches, extremity weakness or numbness.  Psychiatric: denies depression or anxiety.  Endocrine:   denies excessive thirst or urination.  Heme/Lymph:  denies history of anemia, easy bruising or bleeding.    Depression Screening (PHQ-2/PHQ-9): Over the LAST 2 WEEKS   Little interest or pleasure in doing things: Not at all    Feeling down, depressed, or hopeless: Not at all    PHQ-2 SCORE: 0           PHYSICAL EXAM:   Constitutional: well developed, well nourished  Head/Face: normocephalic  Neck/Thyroid: thyroid symmetric, no thyromegaly, no nodules, no adenopathy  Lymphatic:no abnormal supraclavicular or axillary adenopathy is  noted  Breast: normal without palpable masses, tenderness, asymmetry, nipple discharge, nipple retraction or skin changes  Respiratory:  lungs clear to auscultation bilaterally  Cardiovascular: regular rate and rhythm, no significant murmur  Abdomen:  soft, nontender, nondistended, no masses  Skin/Hair: no unusual rashes or bruises  Extremities: no edema, no cyanosis  Psychiatric:  Oriented to time, place, person and situation. Appropriate mood and affect    Pelvic Exam:  External Genitalia: normal appearance, hair distribution, and no lesions  Urethral Meatus:  normal in size, location, without lesions and prolapse  Bladder:  No fullness, masses or tenderness  Vagina:  Normal appearance without lesions, no abnormal discharge  Cervix:  Normal without tenderness on motion  Uterus: normal in size, contour, position, mobility, without tenderness  Adnexa: normal without masses or tenderness  Perineum: normal  Anus: no hemorroids     Results  DIAGNOSTIC  Holter monitor: No abnormalities detected       Assessment & Plan:    Encounter Diagnoses   Name Primary?    Encounter for gynecological examination without abnormal finding Yes    Vaginal odor     Urinary incontinence, unspecified type     Elevated blood pressure reading in office with diagnosis of hypertension     Visit for screening mammogram     Decreased libido      Her PB is elevated today and she states that it is not elevated at home- I asked that she see her pcp  Assessment & Plan  Urinary Incontinence  Occasional bladder leakage with sneezing and urgency suggests stress or urge incontinence. Mild severity. Weight loss may improve symptoms.  - Recommend weight loss to potentially improve symptoms.  - Consider referral to pelvic floor physical therapy.  - Evaluate by a gynecologist specializing in urology if symptoms persist.    Low Libido  Decreased interest in sexual activity likely related to stress, grief, and menopause. Currently in therapy for grief.  -  Suggest considering couples therapy to address relationship and intimacy issues.    Obesity  Needs to lose approximately 100 pounds. Weight contributes to lower back pain and possibly urinary incontinence. Previous weight loss attempts challenging.  - Recommend discussing referral to a weight management clinic with primary care provider.  - Encourage engaging in a weight loss regimen, possibly with the assistance of Dr. Noble at the weight management clinic.    Hypertension  Elevated blood pressure in office, lower at home suggests possible white coat hypertension. No recent medication adjustments. Primary care provider monitoring.  - Advise scheduling an appointment with primary care provider to discuss blood pressure management and home readings.  - Inform primary care provider about elevated blood pressure during office visits.    General Health Maintenance  Overdue for colonoscopy since 2014. Not performing regular breast self-exams.  - Advise scheduling a colonoscopy.  - Encourage performing monthly breast self-exams.    Follow-up  No scheduled follow-up with primary care provider. Last visit in November. Routine labs typically every six months.  - Advise scheduling a follow-up appointment with primary care provider, ideally in April.     SBE encouraged  No orders of the defined types were placed in this encounter.      Requested Prescriptions      No prescriptions requested or ordered in this encounter       [facility] MICHAEL 2D+3D SCREENING BILAT (CPT=77067/49659)                   [1]   Past Medical History:   Depression    willing to discuss    Obesity    Osteoarthritis    Other and unspecified hyperlipidemia    Primary hypothyroidism    Unspecified essential hypertension   [2]   Past Surgical History:  Procedure Laterality Date          2    Colonoscopy  2014    Electrocardiogram, complete  2013    scanned to media tab, 2013   [3]   Social History  Socioeconomic History    Marital  status:    Tobacco Use    Smoking status: Never    Smokeless tobacco: Never   Vaping Use    Vaping status: Never Used   Substance and Sexual Activity    Alcohol use: Yes     Alcohol/week: 1.0 - 2.0 standard drink of alcohol     Types: 1 - 2 Glasses of wine per week     Comment: wine, rarely    Drug use: No    Sexual activity: Not Currently     Comment: menopause   Other Topics Concern    Caffeine Concern Yes     Comment: coffee    Pt has a pacemaker No    Pt has a defibrillator No    Reaction to local anesthetic No   [4]   Family History  Problem Relation Age of Onset    Hypertension Father     Cancer Father         intestinal cancer    Heart Disorder Father     Hypertension Mother     Diabetes Mother     Cancer Mother 75        : Stage 4 Pancreatic Cancer    Other (pancreatic canc) Mother     Hypertension Other     Heart Disease Other     Heart Disorder Maternal Grandmother 76        : Congestive Heart Failure    Asthma Paternal Grandmother             Stroke Maternal Grandfather         ; was also a dialysis patient    Heart Disorder Paternal Grandfather         : heart attack    Heart Disorder Brother         :  COPD   [5]   Current Outpatient Medications:     hydroCHLOROthiazide 25 MG Oral Tab, Take 1 tablet (25 mg total) by mouth daily., Disp: 90 tablet, Rfl: 3    valsartan 320 MG Oral Tab, Take 1 tablet (320 mg total) by mouth daily., Disp: 90 tablet, Rfl: 3    magnesium oxide 400 MG Oral Tab, magnesium 400 mg (as magnesium oxide) tablet, [RxNorm: 199981], Disp: , Rfl:     levocetirizine 5 MG Oral Tab, Take 1 tablet (5 mg total) by mouth daily., Disp: 90 tablet, Rfl: 3    Vitamin D3, Cholecalciferol, 50 MCG (2000 UT) Oral Cap, Take 1 capsule (2,000 Units total) by mouth daily., Disp: , Rfl:     Multiple Vitamins-Minerals (WOMENS 50+ MULTI VITAMIN/MIN) Oral Tab, Take by mouth. (Patient not taking: Reported on 4/10/2025), Disp: , Rfl:   [6]    Allergies  Allergen Reactions    Mushrooms HIVES and NAUSEA ONLY    Crestor [Rosuvastatin] MYALGIA

## 2025-04-11 LAB — HPV E6+E7 MRNA CVX QL NAA+PROBE: NEGATIVE

## 2025-08-12 ENCOUNTER — APPOINTMENT (OUTPATIENT)
Dept: GENERAL RADIOLOGY | Age: 65
End: 2025-08-12
Attending: PHYSICIAN ASSISTANT

## 2025-08-12 ENCOUNTER — HOSPITAL ENCOUNTER (OUTPATIENT)
Age: 65
Discharge: HOME | End: 2025-08-12

## 2025-08-12 VITALS
SYSTOLIC BLOOD PRESSURE: 181 MMHG | OXYGEN SATURATION: 100 % | TEMPERATURE: 98 F | DIASTOLIC BLOOD PRESSURE: 95 MMHG | RESPIRATION RATE: 18 BRPM | HEART RATE: 94 BPM

## 2025-08-12 DIAGNOSIS — M25.562 ACUTE PAIN OF LEFT KNEE: ICD-10-CM
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
## 2025-08-12 DIAGNOSIS — M79.604 RIGHT LEG PAIN: ICD-10-CM

## 2025-08-12 PROCEDURE — 73562 X-RAY EXAM OF KNEE 3: CPT | Performed by: PHYSICIAN ASSISTANT

## 2025-08-12 PROCEDURE — 99213 OFFICE O/P EST LOW 20 MIN: CPT | Performed by: PHYSICIAN ASSISTANT

## 2025-08-12 PROCEDURE — 73590 X-RAY EXAM OF LOWER LEG: CPT | Performed by: PHYSICIAN ASSISTANT

## 2025-08-28 ENCOUNTER — OFFICE VISIT (OUTPATIENT)
Dept: FAMILY MEDICINE CLINIC | Facility: CLINIC | Age: 65
End: 2025-08-28

## 2025-08-28 ENCOUNTER — HOSPITAL ENCOUNTER (OUTPATIENT)
Dept: GENERAL RADIOLOGY | Age: 65
Discharge: HOME | End: 2025-08-28
Attending: FAMILY MEDICINE

## 2025-08-28 VITALS
HEIGHT: 67 IN | BODY MASS INDEX: 40.49 KG/M2 | SYSTOLIC BLOOD PRESSURE: 137 MMHG | WEIGHT: 258 LBS | DIASTOLIC BLOOD PRESSURE: 87 MMHG | HEART RATE: 94 BPM | TEMPERATURE: 96 F

## 2025-08-28 DIAGNOSIS — M79.604 ANTERIOR LEG PAIN, RIGHT: ICD-10-CM
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
## 2025-08-28 DIAGNOSIS — M25.562 ACUTE PAIN OF LEFT KNEE: ICD-10-CM

## 2025-08-28 DIAGNOSIS — M17.12 PRIMARY OSTEOARTHRITIS OF LEFT KNEE: ICD-10-CM
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
## 2025-08-28 PROCEDURE — 3079F DIAST BP 80-89 MM HG: CPT | Performed by: FAMILY MEDICINE

## 2025-08-28 PROCEDURE — 99213 OFFICE O/P EST LOW 20 MIN: CPT | Performed by: FAMILY MEDICINE

## 2025-08-28 PROCEDURE — 3008F BODY MASS INDEX DOCD: CPT | Performed by: FAMILY MEDICINE

## 2025-08-28 PROCEDURE — 73590 X-RAY EXAM OF LOWER LEG: CPT | Performed by: FAMILY MEDICINE

## 2025-08-28 PROCEDURE — 3075F SYST BP GE 130 - 139MM HG: CPT | Performed by: FAMILY MEDICINE

## (undated) NOTE — LETTER
November 14, 2017         Zoila Lott MD  [facility] [address]  [address] [place] [address]      Patient: Miguelangel Noel   YOB: 1960   Date of Visit: 11/14/2017       Dear Dr. Nuris Dee MD,    I saw your patient, Miguelangel Noel, on 11/14/2017.  Enclosed

## (undated) NOTE — MR AVS SNAPSHOT
After Visit Summary   12/17/2020    Grant Higuera    MRN: [id number]           Visit Information     Date & Time  12/17/2020  2:00 PM Provider  Calderon Madrigal MD [address] [place] [address], [address] [place] [address],[address], [facility].  Phone  33 1/9/2021 7:30 AM ADO SCHEDULED RESOURCE [facility]    1/19/2021 11:30 AM MACEY, PROCEDURE [facility] GI PROCEDURE                DM now offers Video Visits through [address] for adult and pediatric patients.   Video Visits are availab Mitchel Eid   Monday – Friday  10:00 am – 10:00 pm   Saturday – Sunday  10:00 am – 4:00 pm     [address]   Monday – Friday  4:00 pm – 10:00 pm   Saturday – Sunday  10:00 am – 4:00 pm  W[facility]IN [facility]ARE  E

## (undated) NOTE — LETTER
1/18/2019              Kirsten Held        [address] [facility] [id number]         Dear Erik Goodwin,      It was a pleasure to see you at our [address] [place] [address] office.   Your mammogram is normal, repeat

## (undated) NOTE — MR AVS SNAPSHOT
[address]  [address] [facility]  [phone]               Thank you for choosing us for your health care visit with Sallie Helm MD.  We are glad to serve you and happy to provide you with this summary Well adult exam    -  Primary    Paresthesia of left foot          Instructions and Information about Your Health     None      Allergies as of May 22, 2017     No Known Allergies                Today's Vital Signs     BP Pulse Temp Height Weight BMI    1

## (undated) NOTE — MR AVS SNAPSHOT
[address]  [address] [facility]  [phone]               Thank you for choosing us for your health care visit with Melanie Palmer.  MD Volodymyr.  We are glad to serve you and happy to provide you with this summary Commonly known as:  DIOVAN           Vitamin D3 2000 units Caps   Take 2,000 Units by mouth daily. Commonly known as:  VITAMIN D3                Where to Get Your Medications      These medications were sent to [address], [place] [address]

## (undated) NOTE — LETTER
AUTHORIZATION FOR SURGICAL OPERATION OR OTHER PROCEDURE    1. I hereby authorize Dr. Bert Avendano, and [employer] staff assigned to my case to perform the following operation and/or procedure at the [place] [facility]:    ENDOMETRIAL BIOPSY    2.  My physician has explained the nature and purpose of the operation or other procedure, possible alternative methods of treatment, the risks involved, and the possibility of complication to me. I acknowledge that no guarantee has been made as to the result that may be obtained. 3.  I recognize that, during the course of this operation, or other procedure, unforseen conditions may necessitate additional or different procedure than those listed above. I, therefore, further authorize and request that the above named physician, his/her physician assistants or designees perform such procedures as are, in his/her professional opinion, necessary and desirable. 4.  Any tissue or organs removed in the operation or other procedure may be disposed of by and at the discretion of the [place] [facility] and [facility]. 5.  I understand that in the event of a medical emergency, I will be transported by local paramedics to [facility] or other hospital emergency department. 6.  I certify that I have read and fully understand the above consent to operation and/or other procedure. 7.  I acknowledge that my physician has explained sedation/analgesia administration to me including the risks and benefits. I consent to the administration of sedation/analgesia as may be necessary or desirable in the judgement of my physician. Witness signature: ___________________________________________________ Date:  ______/______/_____                    Time:  ________ A. M.  P.M.        Patient Name:  ______________________________________________________  (please print)      Patient signature: ___________________________________________________             Relationship to Patient:           []  Parent    Responsible person                          []  Spouse  In case of minor or                    [] Other  _____________   Incompetent name:  __________________________________________________                               (please print)      _____________      Responsible person  In case of minor or  Incompetent signature:  _______________________________________________    Statement of Physician  My signature below affirms that prior to the time of the procedure, I have explained to the patient and/or his/her guardian, the risks and benefits involved in the proposed treatment and any reasonable alternative to the proposed treatment. I have also explained the risks and benefits involved in the refusal of the proposed treatment and have answered the patient's questions.                         Date:  ______/______/_______  Provider                      Signature:  __________________________________________________________       Time:  ___________ A.M    P.M.

## (undated) NOTE — LETTER
12/03/20        Aiden Cue  [address] [facility]      Dear Leora Glass,    [facility] records indicate that you have outstanding lab work and or testing that was ordered for you and has not yet been completed:  Orders Placed This Encounter      ROMI W

## (undated) NOTE — Clinical Note
2/28/2017              Stephanie Cespedes        [address] [place] [address]         To whom it may concern,    Angel Bailey is currently a patient under my medical care.   The patient's medical condition makes serving on jury duty inadvisable a

## (undated) NOTE — LETTER
12/12/22        Stephanie BUCK [address] [facility]      Dear Janey Bender,    [address] [facility] records indicate that you have outstanding lab work and or testing that was ordered for you and has not yet been completed:  Orders Placed This Encounter      CBC With Differential With Platelet      Comp Metabolic Panel (14)      Lipid Panel      TSH W Reflex To Free T4      Vitamin D, 25-Hydroxy      Hemoglobin A1C      XR DEXA BONE DENSITOMETRY (CPT=64106)    To provide you with the best possible care, please complete these orders at your earliest convenience. If you have recently completed these orders please disregard this letter. If you have any questions please call the office at Dept: [phone]. Thank you,       Liz Osborne.  Lisa Dacosta MD

## (undated) NOTE — LETTER
02/18/19        Stephanie Hernández [address] [id number]      Dear Delmar Gama,    [address] [facility] records indicate that you have outstanding lab work and or testing that was ordered for you and has not yet been completed:  CARD ECHO STRESS ECHO/REST AND ST

## (undated) NOTE — MR AVS SNAPSHOT
[address]  [address] [facility]  [phone]               Thank you for choosing us for your health care visit with Leesa Helm MD.  We are glad to serve you and happy to provide you with this summary Vitamin D3 2000 units Caps   Take 2,000 Units by mouth daily. Commonly known as:  VITAMIN D3                Where to Get Your Medications      These medications were sent to [facility]ACY #[id number]- [place], [place] - [address]  AT [address] Educational Information     Healthy Diet and Regular Exercise  [employer] [facility] [address] for making healthy food choices  -   Enjoy your food, but eat less. Fully enjoy your food when eating. Don’t eat while distracted and slow down.    Avoid

## (undated) NOTE — LETTER
8/9/2017              Joe Quinones        [address] [place] [address]. [facility] [address]         Dear Mena Swartz,      It was a pleasure to see you at our [address] [facility] office.   Negative PAP/HPV negative, repeat pap ne

## (undated) NOTE — LETTER
AUTHORIZATION FOR SURGICAL OPERATION OR OTHER PROCEDURE    1.  I hereby [facility]. Patrick Betnon, [facility] staff assigned to my case to perform the following operation and/or procedure at the [facility]:    ENDOMETRIAL BIOPSY    2.  My physic Relationship to Patient:           []  Parent    Responsible person                          []  Spouse  In case of minor or                    [] Other  _____________   Incompetent name:  __________________________________________________

## (undated) NOTE — LETTER
AUTHORIZATION FOR SURGICAL OPERATION OR OTHER PROCEDURE    1. I hereby authorize Dr. Mayur Edwards , and [place] [employer] [place] staff assigned to my case to perform the following operation and/or procedure at the [facility]:    Cortisone injection in Right knee  _______________________________________________________________________________________________      _______________________________________________________________________________________________    2. My physician has explained the nature and purpose of the operation or other procedure, possible alternative methods of treatment, the risks involved, and the possibility of complication to me. I acknowledge that no guarantee has been made as to the result that may be obtained. 3.  I recognize that, during the course of this operation, or other procedure, unforseen conditions may necessitate additional or different procedure than those listed above. I, therefore, further authorize and request that the above named physician, his/her physician assistants or designees perform such procedures as are, in his/her professional opinion, necessary and desirable. 4.  Any tissue or organs removed in the operation or other procedure may be disposed of by and at the discretion of the [facility] and [facility]. 5.  I understand that in the event of a medical emergency, I will be transported by local paramedics to [facility] or other hospital emergency department. 6.  I certify that I have read and fully understand the above consent to operation and/or other procedure. 7.  I acknowledge that my physician has explained sedation/analgesia administration to me including the risks and benefits. I consent to the administration of sedation/analgesia as may be necessary or desirable in the judgement of my physician.     Witness signature: ___________________________________________________ Date: ______/______/_____                    Time:  ________ A. M.  P.M. Patient Name:  ______________________________________________________  (please print)      Patient signature:  ___________________________________________________             Relationship to Patient:           []  Parent    Responsible person                          []  Spouse  In case of minor or                    [] Other  _____________   Incompetent name:  __________________________________________________                               (please print)      _____________      Responsible person  In case of minor or  Incompetent signature:  _______________________________________________    Statement of Physician  My signature below affirms that prior to the time of the procedure, I have explained to the patient and/or his/her guardian, the risks and benefits involved in the proposed treatment and any reasonable alternative to the proposed treatment. I have also explained the risks and benefits involved in the refusal of the proposed treatment and have answered the patient's questions.                         Date:  ______/______/_______  Provider                      Signature:  __________________________________________________________       Time:  ___________ A.M    P.M.